# Patient Record
Sex: MALE | Race: WHITE | NOT HISPANIC OR LATINO
[De-identification: names, ages, dates, MRNs, and addresses within clinical notes are randomized per-mention and may not be internally consistent; named-entity substitution may affect disease eponyms.]

---

## 2017-06-05 ENCOUNTER — APPOINTMENT (OUTPATIENT)
Dept: NEUROLOGY | Facility: CLINIC | Age: 62
End: 2017-06-05

## 2017-08-14 ENCOUNTER — INPATIENT (INPATIENT)
Facility: HOSPITAL | Age: 62
LOS: 4 days | Discharge: HOME | DRG: 32 | End: 2017-08-19
Attending: NEUROLOGICAL SURGERY | Admitting: NEUROLOGICAL SURGERY
Payer: COMMERCIAL

## 2017-08-14 VITALS
HEART RATE: 50 BPM | SYSTOLIC BLOOD PRESSURE: 177 MMHG | RESPIRATION RATE: 16 BRPM | DIASTOLIC BLOOD PRESSURE: 80 MMHG | OXYGEN SATURATION: 100 % | TEMPERATURE: 98 F

## 2017-08-14 DIAGNOSIS — Z98.2 PRESENCE OF CEREBROSPINAL FLUID DRAINAGE DEVICE: Chronic | ICD-10-CM

## 2017-08-14 PROCEDURE — 99285 EMERGENCY DEPT VISIT HI MDM: CPT | Mod: 25

## 2017-08-14 PROCEDURE — 93010 ELECTROCARDIOGRAM REPORT: CPT

## 2017-08-15 DIAGNOSIS — G91.9 HYDROCEPHALUS, UNSPECIFIED: ICD-10-CM

## 2017-08-15 LAB
ALBUMIN SERPL ELPH-MCNC: 4.3 G/DL — SIGNIFICANT CHANGE UP (ref 3.3–5)
ALP SERPL-CCNC: 74 U/L — SIGNIFICANT CHANGE UP (ref 40–120)
ALT FLD-CCNC: 17 U/L RC — SIGNIFICANT CHANGE UP (ref 10–45)
ANION GAP SERPL CALC-SCNC: 14 MMOL/L — SIGNIFICANT CHANGE UP (ref 5–17)
APPEARANCE CSF: CLEAR — SIGNIFICANT CHANGE UP
APTT BLD: 34.9 SEC — SIGNIFICANT CHANGE UP (ref 27.5–37.4)
AST SERPL-CCNC: 16 U/L — SIGNIFICANT CHANGE UP (ref 10–40)
BASOPHILS # BLD AUTO: 0.1 K/UL — SIGNIFICANT CHANGE UP (ref 0–0.2)
BILIRUB SERPL-MCNC: 1.1 MG/DL — SIGNIFICANT CHANGE UP (ref 0.2–1.2)
BUN SERPL-MCNC: 21 MG/DL — SIGNIFICANT CHANGE UP (ref 7–23)
CALCIUM SERPL-MCNC: 9.2 MG/DL — SIGNIFICANT CHANGE UP (ref 8.4–10.5)
CHLORIDE SERPL-SCNC: 107 MMOL/L — SIGNIFICANT CHANGE UP (ref 96–108)
CK MB CFR SERPL CALC: 2.2 NG/ML — SIGNIFICANT CHANGE UP (ref 0–6.7)
CK SERPL-CCNC: 64 U/L — SIGNIFICANT CHANGE UP (ref 30–200)
CO2 SERPL-SCNC: 21 MMOL/L — LOW (ref 22–31)
COLOR CSF: SIGNIFICANT CHANGE UP
CREAT SERPL-MCNC: 1.11 MG/DL — SIGNIFICANT CHANGE UP (ref 0.5–1.3)
EOSINOPHIL # BLD AUTO: 0.2 K/UL — SIGNIFICANT CHANGE UP (ref 0–0.5)
EOSINOPHIL NFR BLD AUTO: 1 % — SIGNIFICANT CHANGE UP (ref 0–6)
GLUCOSE CSF-MCNC: 68 MG/DL — SIGNIFICANT CHANGE UP (ref 40–70)
GLUCOSE SERPL-MCNC: 121 MG/DL — HIGH (ref 70–99)
GRAM STN FLD: SIGNIFICANT CHANGE UP
HCT VFR BLD CALC: 48.8 % — SIGNIFICANT CHANGE UP (ref 39–50)
HGB BLD-MCNC: 17.9 G/DL — HIGH (ref 13–17)
INR BLD: 1.11 RATIO — SIGNIFICANT CHANGE UP (ref 0.88–1.16)
LACTATE CSF-MCNC: 1.8 MMOL/L — SIGNIFICANT CHANGE UP (ref 1.1–2.4)
LYMPHOCYTES # BLD AUTO: 1 K/UL — SIGNIFICANT CHANGE UP (ref 1–3.3)
LYMPHOCYTES # BLD AUTO: 7 % — LOW (ref 13–44)
MCHC RBC-ENTMCNC: 33.8 PG — SIGNIFICANT CHANGE UP (ref 27–34)
MCHC RBC-ENTMCNC: 36.8 GM/DL — HIGH (ref 32–36)
MCV RBC AUTO: 92 FL — SIGNIFICANT CHANGE UP (ref 80–100)
MONOCYTES # BLD AUTO: 0.9 K/UL — SIGNIFICANT CHANGE UP (ref 0–0.9)
MONOCYTES NFR BLD AUTO: 7 % — SIGNIFICANT CHANGE UP (ref 2–14)
NEUTROPHILS # BLD AUTO: 8.4 K/UL — HIGH (ref 1.8–7.4)
NEUTROPHILS # CSF: SIGNIFICANT CHANGE UP (ref 0–6)
NEUTROPHILS NFR BLD AUTO: 83 % — HIGH (ref 43–77)
NRBC NFR CSF: <1 — SIGNIFICANT CHANGE UP (ref 0–5)
PLATELET # BLD AUTO: 151 K/UL — SIGNIFICANT CHANGE UP (ref 150–400)
POTASSIUM SERPL-MCNC: 3.7 MMOL/L — SIGNIFICANT CHANGE UP (ref 3.5–5.3)
POTASSIUM SERPL-SCNC: 3.7 MMOL/L — SIGNIFICANT CHANGE UP (ref 3.5–5.3)
PROT CSF-MCNC: 16 MG/DL — SIGNIFICANT CHANGE UP (ref 15–45)
PROT SERPL-MCNC: 6.9 G/DL — SIGNIFICANT CHANGE UP (ref 6–8.3)
PROTHROM AB SERPL-ACNC: 12.1 SEC — SIGNIFICANT CHANGE UP (ref 9.8–12.7)
RBC # BLD: 5.3 M/UL — SIGNIFICANT CHANGE UP (ref 4.2–5.8)
RBC # CSF: 0 /UL — SIGNIFICANT CHANGE UP (ref 0–0)
RBC # FLD: 12.5 % — SIGNIFICANT CHANGE UP (ref 10.3–14.5)
SODIUM SERPL-SCNC: 142 MMOL/L — SIGNIFICANT CHANGE UP (ref 135–145)
SPECIMEN SOURCE: SIGNIFICANT CHANGE UP
TROPONIN T SERPL-MCNC: <0.01 NG/ML — SIGNIFICANT CHANGE UP (ref 0–0.06)
TUBE TYPE: SIGNIFICANT CHANGE UP
WBC # BLD: 10.6 K/UL — HIGH (ref 3.8–10.5)
WBC # FLD AUTO: 10.6 K/UL — HIGH (ref 3.8–10.5)

## 2017-08-15 PROCEDURE — 74000: CPT | Mod: 26

## 2017-08-15 PROCEDURE — 70250 X-RAY EXAM OF SKULL: CPT | Mod: 26

## 2017-08-15 PROCEDURE — 70450 CT HEAD/BRAIN W/O DYE: CPT | Mod: 26

## 2017-08-15 PROCEDURE — 71010: CPT | Mod: 26

## 2017-08-15 RX ORDER — ACETAMINOPHEN 500 MG
1000 TABLET ORAL ONCE
Qty: 0 | Refills: 0 | Status: DISCONTINUED | OUTPATIENT
Start: 2017-08-15 | End: 2017-08-16

## 2017-08-15 RX ORDER — ACETAZOLAMIDE 250 MG/1
250 TABLET ORAL
Qty: 0 | Refills: 0 | Status: DISCONTINUED | OUTPATIENT
Start: 2017-08-15 | End: 2017-08-15

## 2017-08-15 RX ORDER — DOCUSATE SODIUM 100 MG
100 CAPSULE ORAL THREE TIMES A DAY
Qty: 0 | Refills: 0 | Status: DISCONTINUED | OUTPATIENT
Start: 2017-08-15 | End: 2017-08-19

## 2017-08-15 RX ORDER — ONDANSETRON 8 MG/1
4 TABLET, FILM COATED ORAL EVERY 6 HOURS
Qty: 0 | Refills: 0 | Status: DISCONTINUED | OUTPATIENT
Start: 2017-08-15 | End: 2017-08-19

## 2017-08-15 RX ORDER — SENNA PLUS 8.6 MG/1
2 TABLET ORAL AT BEDTIME
Qty: 0 | Refills: 0 | Status: DISCONTINUED | OUTPATIENT
Start: 2017-08-15 | End: 2017-08-19

## 2017-08-15 RX ORDER — POTASSIUM CHLORIDE 20 MEQ
20 PACKET (EA) ORAL
Qty: 0 | Refills: 0 | Status: DISCONTINUED | OUTPATIENT
Start: 2017-08-15 | End: 2017-08-18

## 2017-08-15 RX ORDER — ENOXAPARIN SODIUM 100 MG/ML
40 INJECTION SUBCUTANEOUS EVERY 24 HOURS
Qty: 0 | Refills: 0 | Status: DISCONTINUED | OUTPATIENT
Start: 2017-08-15 | End: 2017-08-16

## 2017-08-15 RX ORDER — DEXTROSE MONOHYDRATE, SODIUM CHLORIDE, AND POTASSIUM CHLORIDE 50; .745; 4.5 G/1000ML; G/1000ML; G/1000ML
1000 INJECTION, SOLUTION INTRAVENOUS
Qty: 0 | Refills: 0 | Status: DISCONTINUED | OUTPATIENT
Start: 2017-08-15 | End: 2017-08-15

## 2017-08-15 RX ORDER — SODIUM CHLORIDE 9 MG/ML
1000 INJECTION INTRAMUSCULAR; INTRAVENOUS; SUBCUTANEOUS ONCE
Qty: 0 | Refills: 0 | Status: COMPLETED | OUTPATIENT
Start: 2017-08-15 | End: 2017-08-15

## 2017-08-15 RX ADMIN — Medication 20 MILLIEQUIVALENT(S): at 17:42

## 2017-08-15 RX ADMIN — SODIUM CHLORIDE 1000 MILLILITER(S): 9 INJECTION INTRAMUSCULAR; INTRAVENOUS; SUBCUTANEOUS at 01:10

## 2017-08-15 RX ADMIN — Medication 100 MILLIGRAM(S): at 14:12

## 2017-08-15 NOTE — ED PROVIDER NOTE - MEDICAL DECISION MAKING DETAILS
Jean-Claude Elizalde MD (resident): 62 M w/ Hx of hydrocephalus s/p  shunt who p/w confusion and headache, w/ synopal episode during standing for the XR, w/o any associated CP or SOB or palpitations; neuro intact. NSG consult. CT and shunt series.

## 2017-08-15 NOTE — H&P ADULT - HISTORY OF PRESENT ILLNESS
62M with history of hydrocephalus 2/2 traumatic brain injury s/p VPS placed in 2001, last revised in 2010 with Dr. Tinajero p/w worsening gait, memory problems, and fatigue over past 2 weeks. He follows with Dr. Valentine and had his shunt re-adjusted 1 month ago. On CT head today hydrocephalus noted. On eval, he denies headache. Wife corroborates symptoms. No urinary incontinence.

## 2017-08-15 NOTE — ED PROVIDER NOTE - OBJECTIVE STATEMENT
Jean-Claude Elizalde MD (resident): 62 M w/ Hx of hydrocephalus s/p  shunt w/ multiple shunt revisions, who comes in for increased confusion.    Neuro: Bruno Valentine  NSG: Surinder Jean-Claude Elizalde MD (resident): 62 M w/ Hx of hydrocephalus s/p  shunt w/ multiple shunt revisions, who comes in for increased confusion, bitemporal headache, feeling off balance. As per wife, pt with decreased short term memory and abnormal behavior at home by pulling out all the dresser drawers. No weakness, numbness.     Neuro: Bruno Valentine  NSG: Surinder

## 2017-08-15 NOTE — H&P ADULT - ASSESSMENT
Mtailda 62M with history of hydrocephalus s/p VPS placed in 2001, last revised in 2010 with Dr. Tinajero p/w worsening gait, memory problems, and fatigue over past 2 weeks with significantly increased vents on CT.    -admit to neurosurgery  -shunt strata II adjusted to 1.5 last night  -shunt tapped and CSF sent; good flow, pressure < 20

## 2017-08-15 NOTE — ED PROVIDER NOTE - NS ED ROS FT
No fever, no chills, no change in vision, no change in hearing, no chest pain, no shortness of breath, no abdominal pain, no vomiting, no dysuria, no muscle pain, no rashes, + headache. ~ Jean-Claude Elizalde MD

## 2017-08-15 NOTE — ED ADULT NURSE REASSESSMENT NOTE - NS ED NURSE REASSESS COMMENT FT1
Report given to Holding MIKE Wallace. Patient A&O x 4, neuro intact. aware of bed assignment, vital signs stable. Patient in stable condition.

## 2017-08-15 NOTE — ED ADULT NURSE REASSESSMENT NOTE - NS ED NURSE REASSESS COMMENT FT1
received report from Sacha MCKEON. pt resting comfortably in stretcher. A&Ox4. VSS. NAD noted. pt denies pain. family at bedside. pt admitted, awaiting bed. gross neuro intact, PMS x4. dysphagia screening completed. plan of care discussed. safety and comfort measures maintained.

## 2017-08-15 NOTE — ED PROVIDER NOTE - PHYSICAL EXAMINATION
Physical Exam: elderly M who is in NAD, AAOx3, NCAT, MMM, neck is supple, PERRL, CTAB, mild bradycardia and regular rhythm, abdomen is soft and NTND, No edema, No deformity of extremities, No rashes, CN grossly intact, No focal motor or sensory deficits. ~ Jean-Claude Elizalde MD Physical Exam: elderly M who is in NAD, AAOx2, NCAT, MMM, neck is supple, PERRL, CTAB, mild bradycardia and regular rhythm, abdomen is soft and NTND, No edema, No deformity of extremities, No rashes, CN III-XII intact, No focal motor or sensory deficits. 5/5 strength~ Jean-Claude Elizalde MD

## 2017-08-15 NOTE — ED PROVIDER NOTE - PROGRESS NOTE DETAILS
Jean-Claude Elizalde MD (resident): patient had a syncopal event while standing for XR, and vomited once. Seen by NSG and adjusted  shunt for increased hydrocephalus on CT Jean-Claude Elizalde MD (resident): no significant improvement, will be admitted to NSG

## 2017-08-15 NOTE — ED PROVIDER NOTE - ATTENDING CONTRIBUTION TO CARE
attending Bertin: 62yM h/o hydrocephalus s/p  shunt w/ multiple shunt revisions, presents with increased confusion, bitemporal headache, feeling off balance worsening x several weeks. Recently started on diamox for these symptoms. On exam, well-appearing, oriented x 2, unsteady gait, motor/sensory intact, no pronator drift, normal finger to nose. Will obtain CT head, shunt series, bloodwork including electrolytes given new diuretics, ekg, NSG consultation and reassess.

## 2017-08-15 NOTE — H&P ADULT - NSHPPHYSICALEXAM_GEN_ALL_CORE
AOx3, FC, PERRL, EOMI, V1-3 intact, no facial, palate neeru symmetric, tongue midline, shrug 5/5  5/5 throughout, no drift  SILT  No clonus or babinski  Abnormal, shuffling gait    -Valves depress and refill readily; under low pressure (tapped)

## 2017-08-16 DIAGNOSIS — G91.9 HYDROCEPHALUS, UNSPECIFIED: ICD-10-CM

## 2017-08-16 DIAGNOSIS — R20.0 ANESTHESIA OF SKIN: ICD-10-CM

## 2017-08-16 DIAGNOSIS — Z01.818 ENCOUNTER FOR OTHER PREPROCEDURAL EXAMINATION: ICD-10-CM

## 2017-08-16 LAB
ANION GAP SERPL CALC-SCNC: 14 MMOL/L — SIGNIFICANT CHANGE UP (ref 5–17)
BLD GP AB SCN SERPL QL: NEGATIVE — SIGNIFICANT CHANGE UP
BUN SERPL-MCNC: 18 MG/DL — SIGNIFICANT CHANGE UP (ref 7–23)
CALCIUM SERPL-MCNC: 8.6 MG/DL — SIGNIFICANT CHANGE UP (ref 8.4–10.5)
CHLORIDE SERPL-SCNC: 106 MMOL/L — SIGNIFICANT CHANGE UP (ref 96–108)
CO2 SERPL-SCNC: 18 MMOL/L — LOW (ref 22–31)
CREAT SERPL-MCNC: 1.01 MG/DL — SIGNIFICANT CHANGE UP (ref 0.5–1.3)
GLUCOSE SERPL-MCNC: 110 MG/DL — HIGH (ref 70–99)
HCT VFR BLD CALC: 45.2 % — SIGNIFICANT CHANGE UP (ref 39–50)
HGB BLD-MCNC: 16.5 G/DL — SIGNIFICANT CHANGE UP (ref 13–17)
MCHC RBC-ENTMCNC: 31.3 PG — SIGNIFICANT CHANGE UP (ref 27–34)
MCHC RBC-ENTMCNC: 36.5 GM/DL — HIGH (ref 32–36)
MCV RBC AUTO: 85.8 FL — SIGNIFICANT CHANGE UP (ref 80–100)
PLATELET # BLD AUTO: 153 K/UL — SIGNIFICANT CHANGE UP (ref 150–400)
POTASSIUM SERPL-MCNC: 3.9 MMOL/L — SIGNIFICANT CHANGE UP (ref 3.5–5.3)
POTASSIUM SERPL-SCNC: 3.9 MMOL/L — SIGNIFICANT CHANGE UP (ref 3.5–5.3)
RBC # BLD: 5.27 M/UL — SIGNIFICANT CHANGE UP (ref 4.2–5.8)
RBC # FLD: 13.6 % — SIGNIFICANT CHANGE UP (ref 10.3–14.5)
RH IG SCN BLD-IMP: POSITIVE — SIGNIFICANT CHANGE UP
SODIUM SERPL-SCNC: 138 MMOL/L — SIGNIFICANT CHANGE UP (ref 135–145)
WBC # BLD: 7.33 K/UL — SIGNIFICANT CHANGE UP (ref 3.8–10.5)
WBC # FLD AUTO: 7.33 K/UL — SIGNIFICANT CHANGE UP (ref 3.8–10.5)

## 2017-08-16 PROCEDURE — 70450 CT HEAD/BRAIN W/O DYE: CPT | Mod: 26

## 2017-08-16 PROCEDURE — 99223 1ST HOSP IP/OBS HIGH 75: CPT

## 2017-08-16 RX ORDER — SODIUM,POTASSIUM PHOSPHATES 278-250MG
1 POWDER IN PACKET (EA) ORAL
Qty: 0 | Refills: 0 | Status: COMPLETED | OUTPATIENT
Start: 2017-08-16 | End: 2017-08-17

## 2017-08-16 RX ORDER — DEXTROSE MONOHYDRATE, SODIUM CHLORIDE, AND POTASSIUM CHLORIDE 50; .745; 4.5 G/1000ML; G/1000ML; G/1000ML
1000 INJECTION, SOLUTION INTRAVENOUS
Qty: 0 | Refills: 0 | Status: DISCONTINUED | OUTPATIENT
Start: 2017-08-16 | End: 2017-08-18

## 2017-08-16 RX ADMIN — Medication 1 TABLET(S): at 23:38

## 2017-08-16 RX ADMIN — Medication 20 MILLIEQUIVALENT(S): at 17:06

## 2017-08-16 RX ADMIN — Medication 20 MILLIEQUIVALENT(S): at 05:17

## 2017-08-16 RX ADMIN — Medication 1 TABLET(S): at 23:30

## 2017-08-16 NOTE — PHYSICAL THERAPY INITIAL EVALUATION ADULT - PERTINENT HX OF CURRENT PROBLEM, REHAB EVAL
Pt is 62M admitted 8/15/17 PMHx hydrocephalus 2/2 traumatic brain injury, s/p VPS(2001), last revised in 2010; p/w worsening gait, memory problems, & fatigue over past 2 wks. On CT head today hydrocephalus noted.

## 2017-08-16 NOTE — PHYSICAL THERAPY INITIAL EVALUATION ADULT - PRECAUTIONS/LIMITATIONS, REHAB EVAL
CTH: Slight increase in size of the temporal horns when compared with the prior study. no known precautions/limitations/CTH: Slight increase in size of the temporal horns when compared with the prior study.

## 2017-08-16 NOTE — PHYSICAL THERAPY INITIAL EVALUATION ADULT - GENERAL OBSERVATIONS, REHAB EVAL
Pt received OOB sitting in chair, A&Ox3 (requiring choices for year), spouse at bedside, following all commands

## 2017-08-16 NOTE — PROGRESS NOTE ADULT - ASSESSMENT
62M with history of hydrocephalus 2/2 traumatic brain injury s/p VPS placed in 2001, last revised in 2010 with Dr. Tinajero p/w worsening gait, memory problems, and fatigue over past 2 weeks. He follows with Dr. Valentine and had his shunt re-adjusted 1 month ago. On CT head today hydrocephalus noted. s/p shunt tapped and reset to strata@1.5 on 8/15/17    Plan:  neuro- Rpt CT head early am. Tentatively on schedule for shunt revision if hydrocephalus persists. NPO after midnight  Vitals stable  Dr Tinajero discussed plans at length with patient and  and family

## 2017-08-16 NOTE — PHYSICAL THERAPY INITIAL EVALUATION ADULT - DISCHARGE DISPOSITION, PT EVAL
no skilled PT needs/home/home with no skilled PT needs, assist from spouse as necessary; pt functionally independent at this time, pt cleared for DC home by PT standpoint

## 2017-08-16 NOTE — CONSULT NOTE ADULT - ASSESSMENT
62M with VPS (strata 2 @ 2.0) recently adjusted in office 1 month ago p/w worsening hydrocephalus noted on CT head today. Skull x-ray confirmed setting at 2.0... Valve re-adjusted to 1.5 and will re-eval patient in AM for symptom improvement. If symptoms are improved, he may follow up as outpatient with Dr. Tinajero.
62M c hx TBI c/b hydrocephalus s/p  shunt (2001) with multiple revisions, lumbar stenosis, cspine herniated discs, b/l LE heel numbness, pw intermittent memory problems and worsening gait instability likely related to new moderate hydrocephalus.

## 2017-08-16 NOTE — PHYSICAL THERAPY INITIAL EVALUATION ADULT - ADDITIONAL COMMENTS
Pt resides in private home with spouse, about 4 steps to enter, flight to bedroom. PTA independent in mobility and ADL's, owns no DME, (+)driving, on disability (retired chiropractor).

## 2017-08-16 NOTE — CONSULT NOTE ADULT - PROBLEM SELECTOR RECOMMENDATION 9
- pt has very good >4 mets, no hx of heart disease or risk factors for heart disease  - RCRI score of 0, or low risk of major cardiac event  - Coreas criteria score of 7, or low moderate risk of perioperative cardiac event  - would replete potassium to >4 for pt's frequent pvcs on ekg, as well as pt's phos.  - pt had a cardiac evaluation performed 3 years ago that was reportedly normal  - pt is medically optimized for surgery, no contraindication from internal medicine standpoint to proceed with procedure.
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
## 2017-08-17 LAB
ANION GAP SERPL CALC-SCNC: 11 MMOL/L — SIGNIFICANT CHANGE UP (ref 5–17)
BUN SERPL-MCNC: 19 MG/DL — SIGNIFICANT CHANGE UP (ref 7–23)
CALCIUM SERPL-MCNC: 8.6 MG/DL — SIGNIFICANT CHANGE UP (ref 8.4–10.5)
CHLORIDE SERPL-SCNC: 108 MMOL/L — SIGNIFICANT CHANGE UP (ref 96–108)
CO2 SERPL-SCNC: 23 MMOL/L — SIGNIFICANT CHANGE UP (ref 22–31)
CREAT SERPL-MCNC: 0.84 MG/DL — SIGNIFICANT CHANGE UP (ref 0.5–1.3)
GLUCOSE SERPL-MCNC: 111 MG/DL — HIGH (ref 70–99)
HCT VFR BLD CALC: 45.6 % — SIGNIFICANT CHANGE UP (ref 39–50)
HGB BLD-MCNC: 16.8 G/DL — SIGNIFICANT CHANGE UP (ref 13–17)
MCHC RBC-ENTMCNC: 33.5 PG — SIGNIFICANT CHANGE UP (ref 27–34)
MCHC RBC-ENTMCNC: 36.9 GM/DL — HIGH (ref 32–36)
MCV RBC AUTO: 90.8 FL — SIGNIFICANT CHANGE UP (ref 80–100)
PLATELET # BLD AUTO: 135 K/UL — LOW (ref 150–400)
POTASSIUM SERPL-MCNC: 3.8 MMOL/L — SIGNIFICANT CHANGE UP (ref 3.5–5.3)
POTASSIUM SERPL-SCNC: 3.8 MMOL/L — SIGNIFICANT CHANGE UP (ref 3.5–5.3)
RBC # BLD: 5.02 M/UL — SIGNIFICANT CHANGE UP (ref 4.2–5.8)
RBC # FLD: 12.4 % — SIGNIFICANT CHANGE UP (ref 10.3–14.5)
SODIUM SERPL-SCNC: 142 MMOL/L — SIGNIFICANT CHANGE UP (ref 135–145)
WBC # BLD: 7.1 K/UL — SIGNIFICANT CHANGE UP (ref 3.8–10.5)
WBC # FLD AUTO: 7.1 K/UL — SIGNIFICANT CHANGE UP (ref 3.8–10.5)

## 2017-08-17 PROCEDURE — 62225 REPLACE/IRRIGATE CATHETER: CPT

## 2017-08-17 PROCEDURE — 70450 CT HEAD/BRAIN W/O DYE: CPT | Mod: 26

## 2017-08-17 PROCEDURE — 70450 CT HEAD/BRAIN W/O DYE: CPT | Mod: 26,77

## 2017-08-17 RX ORDER — OXYCODONE AND ACETAMINOPHEN 5; 325 MG/1; MG/1
1 TABLET ORAL EVERY 4 HOURS
Qty: 0 | Refills: 0 | Status: DISCONTINUED | OUTPATIENT
Start: 2017-08-17 | End: 2017-08-19

## 2017-08-17 RX ORDER — CEFAZOLIN SODIUM 1 G
2000 VIAL (EA) INJECTION EVERY 8 HOURS
Qty: 0 | Refills: 0 | Status: COMPLETED | OUTPATIENT
Start: 2017-08-17 | End: 2017-08-17

## 2017-08-17 RX ORDER — ACETAMINOPHEN 500 MG
1000 TABLET ORAL ONCE
Qty: 0 | Refills: 0 | Status: DISCONTINUED | OUTPATIENT
Start: 2017-08-17 | End: 2017-08-19

## 2017-08-17 RX ORDER — OXYCODONE AND ACETAMINOPHEN 5; 325 MG/1; MG/1
2 TABLET ORAL EVERY 4 HOURS
Qty: 0 | Refills: 0 | Status: DISCONTINUED | OUTPATIENT
Start: 2017-08-17 | End: 2017-08-19

## 2017-08-17 RX ORDER — HYDROMORPHONE HYDROCHLORIDE 2 MG/ML
0.5 INJECTION INTRAMUSCULAR; INTRAVENOUS; SUBCUTANEOUS
Qty: 0 | Refills: 0 | Status: DISCONTINUED | OUTPATIENT
Start: 2017-08-17 | End: 2017-08-17

## 2017-08-17 RX ADMIN — DEXTROSE MONOHYDRATE, SODIUM CHLORIDE, AND POTASSIUM CHLORIDE 75 MILLILITER(S): 50; .745; 4.5 INJECTION, SOLUTION INTRAVENOUS at 11:22

## 2017-08-17 RX ADMIN — Medication 1 TABLET(S): at 05:28

## 2017-08-17 RX ADMIN — Medication 100 MILLIGRAM(S): at 15:39

## 2017-08-17 RX ADMIN — Medication 20 MILLIEQUIVALENT(S): at 05:28

## 2017-08-17 RX ADMIN — Medication 20 MILLIEQUIVALENT(S): at 18:45

## 2017-08-17 RX ADMIN — OXYCODONE AND ACETAMINOPHEN 1 TABLET(S): 5; 325 TABLET ORAL at 22:04

## 2017-08-17 RX ADMIN — OXYCODONE AND ACETAMINOPHEN 1 TABLET(S): 5; 325 TABLET ORAL at 21:34

## 2017-08-17 RX ADMIN — Medication 100 MILLIGRAM(S): at 15:38

## 2017-08-17 RX ADMIN — OXYCODONE AND ACETAMINOPHEN 2 TABLET(S): 5; 325 TABLET ORAL at 13:36

## 2017-08-17 RX ADMIN — OXYCODONE AND ACETAMINOPHEN 2 TABLET(S): 5; 325 TABLET ORAL at 14:57

## 2017-08-17 RX ADMIN — Medication 100 MILLIGRAM(S): at 21:08

## 2017-08-17 NOTE — BRIEF OPERATIVE NOTE - PROCEDURE
Ventriculoperitoneal shunt revision  08/17/2017  Opened up wali hole at proximal catheter and reconnected; no replacement of system  Active  [id number]

## 2017-08-18 ENCOUNTER — TRANSCRIPTION ENCOUNTER (OUTPATIENT)
Age: 62
End: 2017-08-18

## 2017-08-18 DIAGNOSIS — D72.828 OTHER ELEVATED WHITE BLOOD CELL COUNT: ICD-10-CM

## 2017-08-18 LAB
ALBUMIN SERPL ELPH-MCNC: 3.6 G/DL — SIGNIFICANT CHANGE UP (ref 3.3–5)
ALP SERPL-CCNC: 64 U/L — SIGNIFICANT CHANGE UP (ref 40–120)
ALT FLD-CCNC: 17 U/L — SIGNIFICANT CHANGE UP (ref 10–45)
ANION GAP SERPL CALC-SCNC: 15 MMOL/L — SIGNIFICANT CHANGE UP (ref 5–17)
AST SERPL-CCNC: 19 U/L — SIGNIFICANT CHANGE UP (ref 10–40)
BILIRUB SERPL-MCNC: 1 MG/DL — SIGNIFICANT CHANGE UP (ref 0.2–1.2)
BUN SERPL-MCNC: 19 MG/DL — SIGNIFICANT CHANGE UP (ref 7–23)
CALCIUM SERPL-MCNC: 8.7 MG/DL — SIGNIFICANT CHANGE UP (ref 8.4–10.5)
CHLORIDE SERPL-SCNC: 104 MMOL/L — SIGNIFICANT CHANGE UP (ref 96–108)
CO2 SERPL-SCNC: 22 MMOL/L — SIGNIFICANT CHANGE UP (ref 22–31)
CREAT SERPL-MCNC: 0.89 MG/DL — SIGNIFICANT CHANGE UP (ref 0.5–1.3)
GLUCOSE SERPL-MCNC: 121 MG/DL — HIGH (ref 70–99)
HCT VFR BLD CALC: 43.4 % — SIGNIFICANT CHANGE UP (ref 39–50)
HGB BLD-MCNC: 15.4 G/DL — SIGNIFICANT CHANGE UP (ref 13–17)
MAGNESIUM SERPL-MCNC: 2.2 MG/DL — SIGNIFICANT CHANGE UP (ref 1.6–2.6)
MCHC RBC-ENTMCNC: 30.9 PG — SIGNIFICANT CHANGE UP (ref 27–34)
MCHC RBC-ENTMCNC: 35.5 GM/DL — SIGNIFICANT CHANGE UP (ref 32–36)
MCV RBC AUTO: 87.1 FL — SIGNIFICANT CHANGE UP (ref 80–100)
PHOSPHATE SERPL-MCNC: 2.7 MG/DL — SIGNIFICANT CHANGE UP (ref 2.5–4.5)
PLATELET # BLD AUTO: 155 K/UL — SIGNIFICANT CHANGE UP (ref 150–400)
POTASSIUM SERPL-MCNC: 3.8 MMOL/L — SIGNIFICANT CHANGE UP (ref 3.5–5.3)
POTASSIUM SERPL-SCNC: 3.8 MMOL/L — SIGNIFICANT CHANGE UP (ref 3.5–5.3)
PROT SERPL-MCNC: 6.4 G/DL — SIGNIFICANT CHANGE UP (ref 6–8.3)
RBC # BLD: 4.98 M/UL — SIGNIFICANT CHANGE UP (ref 4.2–5.8)
RBC # FLD: 13.2 % — SIGNIFICANT CHANGE UP (ref 10.3–14.5)
SODIUM SERPL-SCNC: 141 MMOL/L — SIGNIFICANT CHANGE UP (ref 135–145)
WBC # BLD: 11.47 K/UL — HIGH (ref 3.8–10.5)
WBC # FLD AUTO: 11.47 K/UL — HIGH (ref 3.8–10.5)

## 2017-08-18 PROCEDURE — 99232 SBSQ HOSP IP/OBS MODERATE 35: CPT

## 2017-08-18 RX ORDER — ENOXAPARIN SODIUM 100 MG/ML
40 INJECTION SUBCUTANEOUS EVERY 24 HOURS
Qty: 0 | Refills: 0 | Status: DISCONTINUED | OUTPATIENT
Start: 2017-08-18 | End: 2017-08-19

## 2017-08-18 RX ORDER — OXYCODONE HYDROCHLORIDE 5 MG/1
2 TABLET ORAL
Qty: 0 | Refills: 0 | COMMUNITY
Start: 2017-08-18

## 2017-08-18 RX ORDER — HYDRALAZINE HCL 50 MG
10 TABLET ORAL EVERY 4 HOURS
Qty: 0 | Refills: 0 | Status: DISCONTINUED | OUTPATIENT
Start: 2017-08-18 | End: 2017-08-19

## 2017-08-18 RX ORDER — ACETAZOLAMIDE 250 MG/1
0 TABLET ORAL
Qty: 0 | Refills: 0 | COMMUNITY

## 2017-08-18 RX ORDER — DOCUSATE SODIUM 100 MG
1 CAPSULE ORAL
Qty: 0 | Refills: 0 | COMMUNITY
Start: 2017-08-18

## 2017-08-18 RX ORDER — POTASSIUM CHLORIDE 20 MEQ
1 PACKET (EA) ORAL
Qty: 0 | Refills: 0 | COMMUNITY

## 2017-08-18 RX ORDER — SENNA PLUS 8.6 MG/1
2 TABLET ORAL
Qty: 0 | Refills: 0 | COMMUNITY
Start: 2017-08-18

## 2017-08-18 RX ORDER — HYDRALAZINE HCL 50 MG
10 TABLET ORAL ONCE
Qty: 0 | Refills: 0 | Status: COMPLETED | OUTPATIENT
Start: 2017-08-18 | End: 2017-08-18

## 2017-08-18 RX ORDER — OXYCODONE HYDROCHLORIDE 5 MG/1
1 TABLET ORAL
Qty: 42 | Refills: 0 | OUTPATIENT
Start: 2017-08-18 | End: 2017-08-25

## 2017-08-18 RX ADMIN — Medication 100 MILLIGRAM(S): at 21:00

## 2017-08-18 RX ADMIN — Medication 20 MILLIEQUIVALENT(S): at 05:39

## 2017-08-18 RX ADMIN — Medication 10 MILLIGRAM(S): at 20:33

## 2017-08-18 RX ADMIN — Medication 10 MILLIGRAM(S): at 17:23

## 2017-08-18 NOTE — DISCHARGE NOTE ADULT - MEDICATION SUMMARY - MEDICATIONS TO TAKE
I will START or STAY ON the medications listed below when I get home from the hospital:    acetaminophen-oxycodone 325 mg-5 mg oral tablet  -- 1 tab(s) by mouth every 4 hours, As needed, Moderate Pain (4 - 6) MDD:5  -- Indication: For Moderate pain    acetaminophen-oxycodone 325 mg-5 mg oral tablet  -- 2 tab(s) by mouth every 4 hours, As needed, Severe Pain (7 - 10)  -- Indication: For Severe pain    Diamox  --  by mouth   -- Indication: For Hydrocephalus    docusate sodium 100 mg oral capsule  -- 1 cap(s) by mouth 3 times a day  -- Indication: For stool softener    senna oral tablet  -- 2 tab(s) by mouth once a day (at bedtime), As needed, Constipation  -- Indication: For Constipation    Klor-Con M20 oral tablet, extended release  -- 1 tab(s) by mouth 2 times a day  -- Indication: For Potassium replacement I will START or STAY ON the medications listed below when I get home from the hospital:    acetaminophen-oxycodone 325 mg-5 mg oral tablet  -- 1 tab(s) by mouth every 4 hours, As needed, Moderate Pain (4 - 6) MDD:5  -- Indication: For Moderate pain    acetaminophen-oxycodone 325 mg-5 mg oral tablet  -- 2 tab(s) by mouth every 4 hours, As needed, Severe Pain (7 - 10)  -- Indication: For Severe pain    docusate sodium 100 mg oral capsule  -- 1 cap(s) by mouth 3 times a day  -- Indication: For stool softener    senna oral tablet  -- 2 tab(s) by mouth once a day (at bedtime), As needed, Constipation  -- Indication: For Constipation I will START or STAY ON the medications listed below when I get home from the hospital:    acetaminophen-oxycodone 325 mg-5 mg oral tablet  -- 1 tab(s) by mouth every 4 hours, As needed, Moderate Pain (4 - 6) MDD:5  -- Indication: For Hydrocephalus    acetaminophen-oxycodone 325 mg-5 mg oral tablet  -- 2 tab(s) by mouth every 4 hours, As needed, Severe Pain (7 - 10) MDD:12 tablets  -- Indication: For Hydrocephalus    docusate sodium 100 mg oral capsule  -- 1 cap(s) by mouth 3 times a day  -- Indication: For stool softener    senna oral tablet  -- 2 tab(s) by mouth once a day (at bedtime), As needed, Constipation  -- Indication: For Constipation

## 2017-08-18 NOTE — DISCHARGE NOTE ADULT - CARE PLAN
Principal Discharge DX:	Hydrocephalus  Goal:	Strengthening  Instructions for follow-up, activity and diet:	Call Neurosurgery Dr RADHA Tinajero [phone] for appointment on Tuesday in the [place] [facility] for staple removal and wound check. removal.  Followup with your Private MD in 1-2 weeks.  Return to Emergency Department or contact your Neurosurgeon if any changes in mental status, weakness, numbness or tingling of extremities; difficulty swallowing; drainage or redness of wound, fever; pain in legs; difficulty urinating or constipation.  Donot restart your Aspirin or take any Motrin/NSAIDS until checking with your Neurosurgeon.  Instructions for follow-up, activity and diet:	No strenous activity. No heavy lifting. Do not return to work until cleared by physician. No driving until cleared by physician.  You may shower on the 3rd day after you surgery.  No soaking in tub,  Donot apply any ointements to incision.  Your Shunt is a Strata set at 1.0 on 8/18AM Principal Discharge DX:	Hydrocephalus  Goal:	Strengthening  Instructions for follow-up, activity and diet:	Call Neurosurgery Dr RADHA Tinajero [phone] for appointment on Tuesday in the [place] [facility] for staple removal and wound check. removal.  Followup with your Private MD in 1-2 weeks.  Return to Emergency Department or contact your Neurosurgeon if any changes in mental status, weakness, numbness or tingling of extremities; difficulty swallowing; drainage or redness of wound, fever; pain in legs; difficulty urinating or constipation.  Donot restart your Aspirin or take any Motrin/NSAIDS until checking with your Neurosurgeon.  Instructions for follow-up, activity and diet:	No strenous activity. No heavy lifting. Do not return to work until cleared by physician. No driving until cleared by physician.  You may shower on the 3rd day after you surgery.  No soaking in tub,  Donot apply any ointements to incision.  Your Shunt is a Strata set at 1.0 on 8/18AM Principal Discharge DX:	Hydrocephalus  Goal:	Strengthening  Instructions for follow-up, activity and diet:	Call Neurosurgery Dr RADHA Tinajero [phone] for appointment on Tuesday in the [place] [facility] for staple removal and wound check. removal.  Followup with your Private MD in 1-2 weeks.  Return to Emergency Department or contact your Neurosurgeon if any changes in mental status, weakness, numbness or tingling of extremities; difficulty swallowing; drainage or redness of wound, fever; pain in legs; difficulty urinating or constipation.  Donot restart your Aspirin or take any Motrin/NSAIDS until checking with your Neurosurgeon.  Instructions for follow-up, activity and diet:	No strenous activity. No heavy lifting. Do not return to work until cleared by physician. No driving until cleared by physician.  You may shower on the 3rd day after you surgery.  No soaking in tub,  Donot apply any ointements to incision.  Your Shunt is a Strata set at 1.0 on 8/18AM Principal Discharge DX:	Hydrocephalus  Goal:	Strengthening  Instructions for follow-up, activity and diet:	Call Neurosurgery Dr RADHA Tinajero [phone] for appointment on Tuesday in the [place] [facility] for staple removal and wound check. removal.  Followup with your Private MD in 1-2 weeks.  Return to Emergency Department or contact your Neurosurgeon if any changes in mental status, weakness, numbness or tingling of extremities; difficulty swallowing; drainage or redness of wound, fever; pain in legs; difficulty urinating or constipation.  Donot restart your Aspirin or take any Motrin/NSAIDS until checking with your Neurosurgeon.  Instructions for follow-up, activity and diet:	No strenous activity. No heavy lifting. Do not return to work until cleared by physician. No driving until cleared by physician.  You may shower on the 3rd day after you surgery.  No soaking in tub,  Donot apply any ointements to incision.  Your Shunt is a Strata set at 1.0 on 8/18AM Principal Discharge DX:	Hydrocephalus  Goal:	Strengthening  Instructions for follow-up, activity and diet:	Call Neurosurgery Dr RADHA Tinajero [phone] for appointment on Tuesday in the [place] [facility] for staple removal and wound check. removal.  Followup with your Private MD in 1-2 weeks.  Return to Emergency Department or contact your Neurosurgeon if any changes in mental status, weakness, numbness or tingling of extremities; difficulty swallowing; drainage or redness of wound, fever; pain in legs; difficulty urinating or constipation.  Donot restart your Aspirin or take any Motrin/NSAIDS until checking with your Neurosurgeon.  Instructions for follow-up, activity and diet:	No strenous activity. No heavy lifting. Do not return to work until cleared by physician. No driving until cleared by physician.  You may shower on the 3rd day after you surgery.  No soaking in tub,  Donot apply any ointements to incision.  Your Shunt is a Strata set at 1.0 on 8/18AM Principal Discharge DX:	Hydrocephalus  Goal:	Strengthening  Instructions for follow-up, activity and diet:	Call Neurosurgery Dr RADHA Tinajero [phone] for appointment this week in the [place] [facility] for staple removal and wound check. removal.  Followup with your Private MD in 1-2 weeks.  Return to Emergency Department or contact your Neurosurgeon if any changes in mental status, weakness, numbness or tingling of extremities; difficulty swallowing; drainage or redness of wound, fever; pain in legs; difficulty urinating or constipation.  Do not restart your Aspirin or take any Motrin/NSAIDS until checking with your Neurosurgeon.  Instructions for follow-up, activity and diet:	No strenous activity. No heavy lifting. Do not return to work until cleared by physician. No driving until cleared by physician.  You may shower on the 3rd day after you surgery.  No soaking in tub,  Donot apply any ointements to incision.  Your Shunt is a Strata set at 1.0 on 8/18AM Principal Discharge DX:	Hydrocephalus  Goal:	Strengthening  Instructions for follow-up, activity and diet:	Call Neurosurgery Dr RADHA Tinajero [phone] for appointment this week in the [place] [facility] for staple removal and wound check. removal.  Followup with your Private MD in 1-2 weeks.  Return to Emergency Department or contact your Neurosurgeon if any changes in mental status, weakness, numbness or tingling of extremities; difficulty swallowing; drainage or redness of wound, fever; pain in legs; difficulty urinating or constipation.  Do not restart your Aspirin or take any Motrin/NSAIDS until checking with your Neurosurgeon.  Instructions for follow-up, activity and diet:	No strenous activity. No heavy lifting. Do not return to work until cleared by physician. No driving until cleared by physician.  You may shower on the 3rd day after you surgery.  No soaking in tub,  Donot apply any ointements to incision.  Your Shunt is a Strata set at 1.0 on 8/18AM Principal Discharge DX:	Hydrocephalus  Goal:	Strengthening  Instructions for follow-up, activity and diet:	Call Neurosurgery Dr RADHA Tinajero [phone] for appointment this week in the [place] [facility] for staple removal and wound check. removal.  Followup with your Private MD in 1-2 weeks.  Return to Emergency Department or contact your Neurosurgeon if any changes in mental status, weakness, numbness or tingling of extremities; difficulty swallowing; drainage or redness of wound, fever; pain in legs; difficulty urinating or constipation.  Do not restart your Aspirin or take any Motrin/NSAIDS until checking with your Neurosurgeon.  Instructions for follow-up, activity and diet:	No strenous activity. No heavy lifting. Do not return to work until cleared by physician. No driving until cleared by physician.  You may shower on the 3rd day after you surgery.  No soaking in tub,  Donot apply any ointements to incision.  Your Shunt is a Strata set at 1.0 on 8/18AM

## 2017-08-18 NOTE — PROGRESS NOTE ADULT - SUBJECTIVE AND OBJECTIVE BOX
SUBJECTIVE:   Ambulating hallways. No major complaints. Short term memory loss  OVERNIGHT EVENTS: none    Vital Signs Last 24 Hrs  T(C): 36.9 (16 Aug 2017 16:05), Max: 37 (15 Aug 2017 23:38)  HR: 74 (16 Aug 2017 16:05) (41 - 74)  BP: 132/82 (16 Aug 2017 16:05) (132/82 - 150/74)  BP(mean): --  RR: 18 (16 Aug 2017 16:05) (17 - 18)  SpO2: 97% (16 Aug 2017 16:05) (96% - 98%)    PHYSICAL EXAM:    Constitutional: No Acute Distress     Neurological: AOx3, Following Commands, Moving all Extremities 5/5. Sensation intact.       Pulmonary: Clear to Auscultation, No rales, No rhonchi, No wheezes     Cardiovascular: S1, S2, Regular rate and rhythm     Gastrointestinal: Soft, Non-tender, Non-distended     Extremities: No calf tenderness     LABS:                        16.5   7.33  )-----------( 153      ( 16 Aug 2017 08:10 )             45.2    08-16    138  |  106  |  18  ----------------------------<  110<H>  3.9   |  18<L>  |  1.01    Ca    8.6      16 Aug 2017 08:50            IMAGING:     CT head- Slight increase in size of the temporal horns when compared with CT head yesterday          MEDICATIONS:  docusate sodium 100 milliGRAM(s) Oral three times a day  senna 2 Tablet(s) Oral at bedtime PRN Constipation  potassium chloride    Tablet ER 20 milliEquivalent(s) Oral two times a day  enoxaparin Injectable 40 milliGRAM(s) SubCutaneous every 24 hours      DIET:     regular
SUBJECTIVE: No complaints. In chair this AM. Doing well    OVERNIGHT EVENTS: None    Vital Signs Last 24 Hrs  T(C): 36.8 (18 Aug 2017 08:20), Max: 37.2 (18 Aug 2017 04:51)  T(F): 98.2 (18 Aug 2017 08:20), Max: 99 (18 Aug 2017 04:51)  HR: 79 (18 Aug 2017 10:02) (65 - 112)  BP: 158/77 (18 Aug 2017 10:02) (135/78 - 163/82)  BP(mean): --  RR: 18 (18 Aug 2017 08:20) (17 - 18)  SpO2: 98% (18 Aug 2017 08:20) (95% - 98%)  IVF: [ X] NSK 75  DIET: [X ] Regular [ ] CCD [ ] Renal [ ] Puree [ ] Dysphagia [ ] Tube Feeds:   PCA: [ ] YES [X ] NO   BLANCO: [ ] YES [ ] NO [ X] VOID   BM: [ X] YES on 8/15    DRAINS: None    PHYSICAL EXAM:    General: No Acute Distress     Neurological: Awake, alert oriented to person, place and time, Following Commands, PERRL, EOMI, Face Symmetrical, Speech Fluent, Moving all extremities, Muscle Strength normal in all four extremities, No Drift, Sensation to Light Touch Intact    Pulmonary: Clear to Auscultation, No Rales, No Rhonchi, No Wheezes     Cardiovascular: S1, S2, Regular Rate and Rhythm     Gastrointestinal: Soft, Nontender, Nondistended     Extremities: No calf tenderness     Incision: Scalp +staples. CDI, Flat    LABS:                        15.4   11.47 )-----------( 155      ( 18 Aug 2017 07:12 )             43.4    08-18    141  |  104  |  19  ----------------------------<  121<H>  3.8   |  22  |  0.89    Ca    8.7      18 Aug 2017 07:26  Phos  2.7     08-18  Mg     2.2     08-18    TPro  6.4  /  Alb  3.6  /  TBili  1.0  /  DBili  x   /  AST  19  /  ALT  17  /  AlkPhos  64  08-18        08-17 @ 07:01  -  08-18 @ 07:00  --------------------------------------------------------  IN: 1330 mL / OUT: 800 mL / NET: 530 mL      IMAGING:< from: CT Head No Cont (08.17.17 @ 20:45) >  Stable ventricular system size and configuration with postoperative small   amounts of intracranial air.    MEDICATIONS  (STANDING):  potassium chloride    Tablet ER 20 milliEquivalent(s) Oral two times a day  docusate sodium 100 milliGRAM(s) Oral three times a day  sodium chloride 0.9% with potassium chloride 20 mEq/L 1000 milliLiter(s) (75 mL/Hr) IV Continuous <Continuous>    MEDICATIONS  (PRN):  ondansetron   Disintegrating Tablet 4 milliGRAM(s) Oral every 6 hours PRN Nausea  senna 2 Tablet(s) Oral at bedtime PRN Constipation  acetaminophen  IVPB. 1000 milliGRAM(s) IV Intermittent once PRN Mild Pain (1 - 3)  oxyCODONE    5 mG/acetaminophen 325 mG 1 Tablet(s) Oral every 4 hours PRN Moderate Pain (4 - 6)  oxyCODONE    5 mG/acetaminophen 325 mG 2 Tablet(s) Oral every 4 hours PRN Severe Pain (7 - 10)
JIMENEZ KRAFT  62y  Male      Patient is a 62y old  Male who presents with a chief complaint of sp VPS, worsening gait, fatigue, memory problems  62M with history of hydrocephalus 2/2 traumatic brain injury s/p VPS placed in 2001, last revised in 2010 with Dr. Tinajero p/w worsening gait, memory problems, and fatigue over past 2 weeks. He follows with Dr. Valentine and had his shunt re-adjusted 1 month ago. On CT head today hydrocephalus noted. On eval, he denies headache. Wife corroborates symptoms. No urinary incontinence. (18 Aug 2017 13:03)      INTERVAL HPI/OVERNIGHT EVENTS: s/p  revision yesterday.  Patient states less "pressure" in head.  Able to ambulte without imbalance this AM.  Denies all other complaints.         T(C): 36.8 (08-18-17 @ 08:20), Max: 37.2 (08-18-17 @ 04:51)  HR: 79 (08-18-17 @ 10:02) (65 - 81)  BP: 158/77 (08-18-17 @ 10:02) (136/71 - 163/82)  RR: 18 (08-18-17 @ 08:20) (17 - 18)  SpO2: 98% (08-18-17 @ 08:20) (95% - 98%)  Wt(kg): --Vital Signs Last 24 Hrs  T(C): 36.8 (18 Aug 2017 08:20), Max: 37.2 (18 Aug 2017 04:51)  T(F): 98.2 (18 Aug 2017 08:20), Max: 99 (18 Aug 2017 04:51)  HR: 79 (18 Aug 2017 10:02) (65 - 81)  BP: 158/77 (18 Aug 2017 10:02) (136/71 - 163/82)  BP(mean): --  RR: 18 (18 Aug 2017 08:20) (17 - 18)  SpO2: 98% (18 Aug 2017 08:20) (95% - 98%)  Wt(kg): --    PHYSICAL EXAM:  GENERAL: NAD, well-groomed, well-developed  HEAD:  bandage over head c/d/i  ENMT:Moist mucous membranes  NECK: Supple,  CHEST/LUNG: Clear to percussion bilaterally; No rales, rhonchi, wheezing, or rubs  HEART: Regular rate and rhythm; No murmurs, rubs, or gallops  ABDOMEN: Soft, Nontender, Nondistended; Bowel sounds present.    EXTREMITIES:  2+ Peripheral Pulses, No clubbing, cyanosis, or edema  NERVOUS SYSTEM:  ; Motor Strength 5/5 B/L upper and lower extremities.      Consultant(s) Notes Reviewed:  [x ] YES  [ ] NO  Care Discussed with Consultants/Other Providers [ x] YES  [ ] NO: neurosurgery PA (Saurabh    LABS:                        15.4   11.47 )-----------( 155      ( 18 Aug 2017 07:12 )             43.4     08-18    141  |  104  |  19  ----------------------------<  121<H>  3.8   |  22  |  0.89    Ca    8.7      18 Aug 2017 07:26  Phos  2.7     08-18  Mg     2.2     08-18    TPro  6.4  /  Alb  3.6  /  TBili  1.0  /  DBili  x   /  AST  19  /  ALT  17  /  AlkPhos  64  08-18        CAPILLARY BLOOD GLUCOSE                RADIOLOGY & ADDITIONAL TESTS:    < from: CT Head No Cont (08.17.17 @ 20:45) >    IMPRESSION:  Stable ventricular system size and configuration with postoperative small   amounts of intracranial air.    < end of copied text >    Imaging Personally Reviewed:  [ ] YES  [ ] NO

## 2017-08-18 NOTE — DISCHARGE NOTE ADULT - HOSPITAL COURSE
The patient was admitted on 8/15/2017 and taken to the OR this same day.  Postop course was uneventful.  The patient was on Ancef and SQL for routine postop management.  His shunt was dial down from 1.5 to 1.0 on 8/18AM.  He is to FU with Dr Tinajero on Tues at the [facility].  He takes Tylenol for pain only and donot want narcotics.    The patient was evaluated by PT and no needs was recommended .   He was subsequently DC on 8/18/2017 in stable condition. The patient was admitted on 8/15/2017 and taken to the OR this same day.  Postop course was uneventful.  The patient was on Ancef and SQL for routine postop management.  His shunt was dial down from 1.5 to 1.0 on 8/18AM.  He is to FU with Dr Tinajero  at the [place] [facility].  He takes Tylenol for pain only and donot want narcotics.    The patient was evaluated by PT and no needs was recommended . CTH 8/19 showed postoperative changes. He was subsequently DC on 8/19/2017 in stable condition.

## 2017-08-18 NOTE — PROGRESS NOTE ADULT - ASSESSMENT
62M with history of hydrocephalus 2/2 traumatic brain injury s/p VPS placed in 2001, last revised in 2010 with Dr. Tinajero p/w worsening gait, memory problems, and fatigue over past 2 weeks. He follows with Dr. Valentine and had his shunt re-adjusted 1 month ago. On CT head today hydrocephalus noted. On eval, he denies headache. Wife corroborates symptoms. No urinary incontinence. (15 Aug 2017 09:34)    PROCEDURE: Adm 8/15 Revision Proximal Ventriculoperitoneal shunt Strata 2 -set at 1.5 and changed to 1.0 on 8/18.     POD#1    PLAN:  Neuro: Shunt decreased to 1.0 this AM.  Monitor Leukocytosis clinically. 8/15 CSF NGTD. DC Home today. FU Dr RADHA Tinajero on Tues. Just want Tyl for pain.    Respiratory: Patient instructed to use incentive spirometer [x ] YES [ ] NO  :               DVT ppx: [ ] SQL [ ] SQH and Venodynes [ ] Left [ ] Right [ X] Bilateral    Discharge planning: PT-No needs 62M with history of hydrocephalus 2/2 traumatic brain injury s/p VPS placed in 2001, last revised in 2010 with Dr. Tinajero p/w worsening gait, memory problems, and fatigue over past 2 weeks. He follows with Dr. Valentine and had his shunt re-adjusted 1 month ago. On CT head today hydrocephalus noted. On eval, he denies headache. Wife corroborates symptoms. No urinary incontinence. (15 Aug 2017 09:34)    PROCEDURE: Adm 8/15 Revision Proximal Ventriculoperitoneal shunt Strata 2 -set at 1.5 and changed to 1.0 on 8/18.     POD#1    PLAN:  Neuro: Shunt decreased to 1.0 this AM.  Monitor Leukocytosis clinically. 8/15 CSF NGTD. DC Home today. FU Dr RADHA Tinajero on Tues. Just want Tyl for pain.  Addendum @ 3:20pm:  Shunt was decreased to 1.0 this AM. Pt was to be DC Home today. However on arrival of family at pt bedside at this time-noted pt to be slightly confused.    Exam: Amb independently. No HA, AAO x2, but not to Month. +FC, MEEHAN 5/5.  PERRLA. This was d/w Dr Tinajero at this time and discharged was canceled today.  FU CT Brain AM and if stable will discharge. This was d/w the patient family and agreeable.    Respiratory: Patient instructed to use incentive spirometer [x ] YES [ ] NO  :               DVT ppx: [ ] SQL [ ] SQH and Venodynes [ ] Left [ ] Right [ X] Bilateral    Discharge planning: PT-No needs

## 2017-08-18 NOTE — DISCHARGE NOTE ADULT - MEDICATION SUMMARY - MEDICATIONS TO STOP TAKING
I will STOP taking the medications listed below when I get home from the hospital:  None I will STOP taking the medications listed below when I get home from the hospital:    Diamox  --  by mouth    Klor-Con M20 oral tablet, extended release  -- 1 tab(s) by mouth 2 times a day

## 2017-08-18 NOTE — DISCHARGE NOTE ADULT - PLAN OF CARE
Strengthening Call Neurosurgery Dr RADHA Tinajero [phone] for appointment on Tuesday in the [place] [facility] for staple removal and wound check. removal.  Followup with your Private MD in 1-2 weeks.  Return to Emergency Department or contact your Neurosurgeon if any changes in mental status, weakness, numbness or tingling of extremities; difficulty swallowing; drainage or redness of wound, fever; pain in legs; difficulty urinating or constipation.  Donot restart your Aspirin or take any Motrin/NSAIDS until checking with your Neurosurgeon. No strenous activity. No heavy lifting. Do not return to work until cleared by physician. No driving until cleared by physician.  You may shower on the 3rd day after you surgery.  No soaking in tub,  Donot apply any ointements to incision.  Your Shunt is a Strata set at 1.0 on 8/18AM Call Neurosurgery Dr RADHA Tinajero [phone] for appointment this week in the [place] Office for staple removal and wound check. removal.  Followup with your Private MD in 1-2 weeks.  Return to Emergency Department or contact your Neurosurgeon if any changes in mental status, weakness, numbness or tingling of extremities; difficulty swallowing; drainage or redness of wound, fever; pain in legs; difficulty urinating or constipation.  Do not restart your Aspirin or take any Motrin/NSAIDS until checking with your Neurosurgeon.

## 2017-08-18 NOTE — PROGRESS NOTE ADULT - PROBLEM SELECTOR PLAN 1
s/p shunt revision.   -stable hydrocephalus form CT   -discussed with PA, to revise shunt setting down.  -follow up CT and neuro exams per NSX s/p shunt revision.   -stable hydrocephalus form CT   -discussed with PA, to revise shunt setting down.  -follow up CT and neuro exams per NSX  -would D/C standing 20meQ hyperkalemia BID, change to 20 meQ daily while on diamox

## 2017-08-18 NOTE — DISCHARGE NOTE ADULT - NS AS ACTIVITY OBS
No Heavy lifting/straining/Showering allowed/Do not make important decisions/Bathing allowed/Walking-Outdoors allowed/Walking-Indoors allowed/Stairs allowed/Do not drive or operate machinery

## 2017-08-18 NOTE — PROGRESS NOTE ADULT - ASSESSMENT
62M c hx TBI c/b hydrocephalus s/p  shunt (2001) with multiple revisions, lumbar stenosis, cspine herniated discs, b/l LE heel numbness, pw intermittent memory problems and worsening gait instability likely related to new moderate hydrocephalus.

## 2017-08-18 NOTE — DISCHARGE NOTE ADULT - CARE PROVIDER_API CALL
Luis Tinajero (DO), Neurological Surgery  [address] [place] [address]  Phone: [phone]  Fax: [phone]

## 2017-08-18 NOTE — DISCHARGE NOTE ADULT - REASON FOR ADMISSION
sp VPS, worsening gait, fatigue, memory problems  62M with history of hydrocephalus 2/2 traumatic brain injury s/p VPS placed in 2001, last revised in 2010 with Dr. Tinajero p/w worsening gait, memory problems, and fatigue over past 2 weeks. He follows with Dr. Valentine and had his shunt re-adjusted 1 month ago. On CT head today hydrocephalus noted. On eval, he denies headache. Wife corroborates symptoms. No urinary incontinence.

## 2017-08-19 VITALS
TEMPERATURE: 98 F | HEART RATE: 73 BPM | RESPIRATION RATE: 18 BRPM | DIASTOLIC BLOOD PRESSURE: 75 MMHG | OXYGEN SATURATION: 97 % | SYSTOLIC BLOOD PRESSURE: 131 MMHG

## 2017-08-19 LAB
HCT VFR BLD CALC: 43.1 % — SIGNIFICANT CHANGE UP (ref 39–50)
HGB BLD-MCNC: 15.5 G/DL — SIGNIFICANT CHANGE UP (ref 13–17)
MCHC RBC-ENTMCNC: 33.1 PG — SIGNIFICANT CHANGE UP (ref 27–34)
MCHC RBC-ENTMCNC: 36 GM/DL — SIGNIFICANT CHANGE UP (ref 32–36)
MCV RBC AUTO: 92 FL — SIGNIFICANT CHANGE UP (ref 80–100)
PLATELET # BLD AUTO: 149 K/UL — LOW (ref 150–400)
RBC # BLD: 4.68 M/UL — SIGNIFICANT CHANGE UP (ref 4.2–5.8)
RBC # FLD: 12.1 % — SIGNIFICANT CHANGE UP (ref 10.3–14.5)
WBC # BLD: 9 K/UL — SIGNIFICANT CHANGE UP (ref 3.8–10.5)
WBC # FLD AUTO: 9 K/UL — SIGNIFICANT CHANGE UP (ref 3.8–10.5)

## 2017-08-19 PROCEDURE — 85610 PROTHROMBIN TIME: CPT

## 2017-08-19 PROCEDURE — 85027 COMPLETE CBC AUTOMATED: CPT

## 2017-08-19 PROCEDURE — 71010: CPT

## 2017-08-19 PROCEDURE — 83605 ASSAY OF LACTIC ACID: CPT

## 2017-08-19 PROCEDURE — 84100 ASSAY OF PHOSPHORUS: CPT

## 2017-08-19 PROCEDURE — 82945 GLUCOSE OTHER FLUID: CPT

## 2017-08-19 PROCEDURE — 87070 CULTURE OTHR SPECIMN AEROBIC: CPT

## 2017-08-19 PROCEDURE — 89051 BODY FLUID CELL COUNT: CPT

## 2017-08-19 PROCEDURE — 85730 THROMBOPLASTIN TIME PARTIAL: CPT

## 2017-08-19 PROCEDURE — 86850 RBC ANTIBODY SCREEN: CPT

## 2017-08-19 PROCEDURE — 80053 COMPREHEN METABOLIC PANEL: CPT

## 2017-08-19 PROCEDURE — 99285 EMERGENCY DEPT VISIT HI MDM: CPT | Mod: 25

## 2017-08-19 PROCEDURE — 86901 BLOOD TYPING SEROLOGIC RH(D): CPT

## 2017-08-19 PROCEDURE — 87205 SMEAR GRAM STAIN: CPT

## 2017-08-19 PROCEDURE — 70450 CT HEAD/BRAIN W/O DYE: CPT

## 2017-08-19 PROCEDURE — 82550 ASSAY OF CK (CPK): CPT

## 2017-08-19 PROCEDURE — C1889: CPT

## 2017-08-19 PROCEDURE — 82553 CREATINE MB FRACTION: CPT

## 2017-08-19 PROCEDURE — 70450 CT HEAD/BRAIN W/O DYE: CPT | Mod: 26

## 2017-08-19 PROCEDURE — 74000: CPT

## 2017-08-19 PROCEDURE — 97161 PT EVAL LOW COMPLEX 20 MIN: CPT

## 2017-08-19 PROCEDURE — 97164 PT RE-EVAL EST PLAN CARE: CPT

## 2017-08-19 PROCEDURE — 84484 ASSAY OF TROPONIN QUANT: CPT

## 2017-08-19 PROCEDURE — 93005 ELECTROCARDIOGRAM TRACING: CPT

## 2017-08-19 PROCEDURE — 70250 X-RAY EXAM OF SKULL: CPT

## 2017-08-19 PROCEDURE — 80048 BASIC METABOLIC PNL TOTAL CA: CPT

## 2017-08-19 PROCEDURE — 86900 BLOOD TYPING SEROLOGIC ABO: CPT

## 2017-08-19 PROCEDURE — 83735 ASSAY OF MAGNESIUM: CPT

## 2017-08-19 PROCEDURE — 84157 ASSAY OF PROTEIN OTHER: CPT

## 2017-08-19 RX ORDER — OXYCODONE HYDROCHLORIDE 5 MG/1
2 TABLET ORAL
Qty: 60 | Refills: 0 | OUTPATIENT
Start: 2017-08-19 | End: 2017-08-24

## 2017-08-19 RX ORDER — OXYCODONE HYDROCHLORIDE 5 MG/1
1 TABLET ORAL
Qty: 42 | Refills: 0 | OUTPATIENT
Start: 2017-08-19 | End: 2017-08-26

## 2017-08-19 RX ADMIN — Medication 10 MILLIGRAM(S): at 08:20

## 2017-08-19 RX ADMIN — Medication 100 MILLIGRAM(S): at 05:04

## 2017-08-20 ENCOUNTER — INPATIENT (INPATIENT)
Facility: HOSPITAL | Age: 62
LOS: 2 days | Discharge: HOME | DRG: 32 | End: 2017-08-23
Attending: NEUROLOGICAL SURGERY | Admitting: NEUROLOGICAL SURGERY
Payer: COMMERCIAL

## 2017-08-20 VITALS
HEART RATE: 80 BPM | TEMPERATURE: 98 F | RESPIRATION RATE: 18 BRPM | OXYGEN SATURATION: 98 % | DIASTOLIC BLOOD PRESSURE: 87 MMHG | SYSTOLIC BLOOD PRESSURE: 154 MMHG

## 2017-08-20 DIAGNOSIS — Z98.2 PRESENCE OF CEREBROSPINAL FLUID DRAINAGE DEVICE: Chronic | ICD-10-CM

## 2017-08-20 DIAGNOSIS — Z98.2 PRESENCE OF CEREBROSPINAL FLUID DRAINAGE DEVICE: ICD-10-CM

## 2017-08-20 LAB
ALBUMIN SERPL ELPH-MCNC: 4.2 G/DL — SIGNIFICANT CHANGE UP (ref 3.3–5)
ALP SERPL-CCNC: 70 U/L — SIGNIFICANT CHANGE UP (ref 40–120)
ALT FLD-CCNC: 24 U/L RC — SIGNIFICANT CHANGE UP (ref 10–45)
ANION GAP SERPL CALC-SCNC: 13 MMOL/L — SIGNIFICANT CHANGE UP (ref 5–17)
APTT BLD: 36 SEC — SIGNIFICANT CHANGE UP (ref 27.5–37.4)
AST SERPL-CCNC: 18 U/L — SIGNIFICANT CHANGE UP (ref 10–40)
BASOPHILS # BLD AUTO: 0.1 K/UL — SIGNIFICANT CHANGE UP (ref 0–0.2)
BASOPHILS NFR BLD AUTO: 1 % — SIGNIFICANT CHANGE UP (ref 0–2)
BILIRUB SERPL-MCNC: 0.9 MG/DL — SIGNIFICANT CHANGE UP (ref 0.2–1.2)
BLD GP AB SCN SERPL QL: NEGATIVE — SIGNIFICANT CHANGE UP
BUN SERPL-MCNC: 19 MG/DL — SIGNIFICANT CHANGE UP (ref 7–23)
CALCIUM SERPL-MCNC: 9 MG/DL — SIGNIFICANT CHANGE UP (ref 8.4–10.5)
CHLORIDE SERPL-SCNC: 100 MMOL/L — SIGNIFICANT CHANGE UP (ref 96–108)
CO2 SERPL-SCNC: 25 MMOL/L — SIGNIFICANT CHANGE UP (ref 22–31)
CREAT SERPL-MCNC: 0.83 MG/DL — SIGNIFICANT CHANGE UP (ref 0.5–1.3)
EOSINOPHIL # BLD AUTO: 0.3 K/UL — SIGNIFICANT CHANGE UP (ref 0–0.5)
EOSINOPHIL NFR BLD AUTO: 3.1 % — SIGNIFICANT CHANGE UP (ref 0–6)
GLUCOSE SERPL-MCNC: 97 MG/DL — SIGNIFICANT CHANGE UP (ref 70–99)
HCT VFR BLD CALC: 45.1 % — SIGNIFICANT CHANGE UP (ref 39–50)
HGB BLD-MCNC: 16.3 G/DL — SIGNIFICANT CHANGE UP (ref 13–17)
INR BLD: 1.11 RATIO — SIGNIFICANT CHANGE UP (ref 0.88–1.16)
LYMPHOCYTES # BLD AUTO: 2.7 K/UL — SIGNIFICANT CHANGE UP (ref 1–3.3)
LYMPHOCYTES # BLD AUTO: 29.6 % — SIGNIFICANT CHANGE UP (ref 13–44)
MCHC RBC-ENTMCNC: 33.1 PG — SIGNIFICANT CHANGE UP (ref 27–34)
MCHC RBC-ENTMCNC: 36.2 GM/DL — HIGH (ref 32–36)
MCV RBC AUTO: 91.7 FL — SIGNIFICANT CHANGE UP (ref 80–100)
MONOCYTES # BLD AUTO: 1 K/UL — HIGH (ref 0–0.9)
MONOCYTES NFR BLD AUTO: 11 % — SIGNIFICANT CHANGE UP (ref 2–14)
NEUTROPHILS # BLD AUTO: 5 K/UL — SIGNIFICANT CHANGE UP (ref 1.8–7.4)
NEUTROPHILS NFR BLD AUTO: 55.3 % — SIGNIFICANT CHANGE UP (ref 43–77)
PLATELET # BLD AUTO: 183 K/UL — SIGNIFICANT CHANGE UP (ref 150–400)
POTASSIUM SERPL-MCNC: 4 MMOL/L — SIGNIFICANT CHANGE UP (ref 3.5–5.3)
POTASSIUM SERPL-SCNC: 4 MMOL/L — SIGNIFICANT CHANGE UP (ref 3.5–5.3)
PROT SERPL-MCNC: 6.8 G/DL — SIGNIFICANT CHANGE UP (ref 6–8.3)
PROTHROM AB SERPL-ACNC: 12.1 SEC — SIGNIFICANT CHANGE UP (ref 9.8–12.7)
RBC # BLD: 4.92 M/UL — SIGNIFICANT CHANGE UP (ref 4.2–5.8)
RBC # FLD: 12.2 % — SIGNIFICANT CHANGE UP (ref 10.3–14.5)
RH IG SCN BLD-IMP: POSITIVE — SIGNIFICANT CHANGE UP
SODIUM SERPL-SCNC: 138 MMOL/L — SIGNIFICANT CHANGE UP (ref 135–145)
WBC # BLD: 9.1 K/UL — SIGNIFICANT CHANGE UP (ref 3.8–10.5)
WBC # FLD AUTO: 9.1 K/UL — SIGNIFICANT CHANGE UP (ref 3.8–10.5)

## 2017-08-20 PROCEDURE — 70450 CT HEAD/BRAIN W/O DYE: CPT | Mod: 26

## 2017-08-20 PROCEDURE — 93010 ELECTROCARDIOGRAM REPORT: CPT

## 2017-08-20 PROCEDURE — 99285 EMERGENCY DEPT VISIT HI MDM: CPT | Mod: 25

## 2017-08-20 RX ORDER — ACETAMINOPHEN 500 MG
650 TABLET ORAL EVERY 6 HOURS
Qty: 0 | Refills: 0 | Status: DISCONTINUED | OUTPATIENT
Start: 2017-08-20 | End: 2017-08-21

## 2017-08-20 RX ORDER — OXYCODONE AND ACETAMINOPHEN 5; 325 MG/1; MG/1
1 TABLET ORAL EVERY 4 HOURS
Qty: 0 | Refills: 0 | Status: DISCONTINUED | OUTPATIENT
Start: 2017-08-20 | End: 2017-08-21

## 2017-08-20 RX ORDER — SENNA PLUS 8.6 MG/1
2 TABLET ORAL AT BEDTIME
Qty: 0 | Refills: 0 | Status: DISCONTINUED | OUTPATIENT
Start: 2017-08-20 | End: 2017-08-21

## 2017-08-20 RX ORDER — ONDANSETRON 8 MG/1
4 TABLET, FILM COATED ORAL EVERY 4 HOURS
Qty: 0 | Refills: 0 | Status: DISCONTINUED | OUTPATIENT
Start: 2017-08-20 | End: 2017-08-21

## 2017-08-20 RX ORDER — DEXTROSE MONOHYDRATE, SODIUM CHLORIDE, AND POTASSIUM CHLORIDE 50; .745; 4.5 G/1000ML; G/1000ML; G/1000ML
1000 INJECTION, SOLUTION INTRAVENOUS
Qty: 0 | Refills: 0 | Status: DISCONTINUED | OUTPATIENT
Start: 2017-08-20 | End: 2017-08-21

## 2017-08-20 RX ORDER — OXYCODONE AND ACETAMINOPHEN 5; 325 MG/1; MG/1
2 TABLET ORAL EVERY 4 HOURS
Qty: 0 | Refills: 0 | Status: DISCONTINUED | OUTPATIENT
Start: 2017-08-20 | End: 2017-08-21

## 2017-08-20 RX ORDER — ACETAMINOPHEN 500 MG
1000 TABLET ORAL ONCE
Qty: 0 | Refills: 0 | Status: COMPLETED | OUTPATIENT
Start: 2017-08-20 | End: 2017-08-20

## 2017-08-20 RX ORDER — DOCUSATE SODIUM 100 MG
100 CAPSULE ORAL THREE TIMES A DAY
Qty: 0 | Refills: 0 | Status: DISCONTINUED | OUTPATIENT
Start: 2017-08-20 | End: 2017-08-21

## 2017-08-20 RX ADMIN — Medication 1000 MILLIGRAM(S): at 22:00

## 2017-08-20 RX ADMIN — Medication 400 MILLIGRAM(S): at 21:10

## 2017-08-20 NOTE — ED PROVIDER NOTE - ATTENDING CONTRIBUTION TO CARE
Patient with chief complaint of headache. Moderate right sided. Persistent. Worsening. Patient with revision on 8/17 but having persistent pain.  ncat, mild distress secondary to pain, non-tachycardic, non-tachycardic, no edema, ncat, perrl, tracking in room, alert, cooperative, pleasant, good  strength, moving all extremities.  Will follow up on labs, analgesia, reassess and disposition to the inpatient team as clinically indicated. See attending statement below

## 2017-08-20 NOTE — ED PROVIDER NOTE - PHYSICAL EXAMINATION
Rebeca Beck, DO:   Gen: Well appearing, NAD  Head: NCAT  HEENT: PERRL, MMM, normal conjunctiva, anicteric, neck supple  Lung: CTAB, no adventitious sounds  CV: RRR, no murmurs, rubs or gallops  Abd: soft, NTND, no rebound or guarding, no CVAT  MSK: No edema, no visible deformities  Neuro: No focal neurologic deficits. CN II-XII intact. 5/5 strength and normal sensation in all extremities. No dysmetria. No diadochokinesia.   Skin: Warm and dry, no evidence of rash  Psych: normal mood and affect

## 2017-08-20 NOTE — ED ADULT NURSE NOTE - OBJECTIVE STATEMENT
61 y/o male, 63 y/o male, history of  shunt originally placed in 2001, walked into ED c/o HA. Patient reports he has had about 5 revisions to shunt in the past with the last revision about 6 years ago. States he has been getting headaches more frequently and was recently hospitalized at [facility] due to "bone growth around shunt".  Patient received surgery and was discharged on Thursday. Patient states he has been feeling like his "equilibrium is off" and his "gait is shuffled" for the past few days. States he called his neurosurgeon who told him to come to ED for repeat CT scan and possible revision of shunt. Patient denies current SOB, CP, abd pain, N/V, fever, dizziness. Currently c/o "unclear vision" and HA 3/10. Lungs clear b/l. Abd soft, nondistended, nontender, PERRL. Patient resting in bed awaiting MD rodriguez. Safety and comfort maintained.

## 2017-08-20 NOTE — ED PROVIDER NOTE - OBJECTIVE STATEMENT
Rebeca Beck, DO: 62M with history of hydrocephalus 2/2 traumatic brain injury s/p VPS placed in 2001, last revised in 2010 with Dr. Tinajero p/w admitted 8/15 with  shunt revision 8/17. Pt discharged 8/19. Pt with urinary incontinence & bowel incontinence yesterday.  Pt with somnolence per wife at bedside & episodes of short-term memory loss. Pt AAOx3. Pt with shuffling gait, unchanged since discharge.

## 2017-08-20 NOTE — H&P ADULT - NSHPPHYSICALEXAM_GEN_ALL_CORE
Awake, alert, AOX1  Unable to recall 3 words at 5 minutes  Unable to spell WORLD backwards  PERRL, EOMI, Face equal, tongue m/l  5/5 throughout   SILT

## 2017-08-20 NOTE — ED PROVIDER NOTE - CARE PLAN
Principal Discharge DX:	Ventriculo-peritoneal shunt status  Secondary Diagnosis:	S/P  shunt  Secondary Diagnosis:	Bladder incontinence Principal Discharge DX:	Ventriculo-peritoneal shunt status  Goal:	Ventriculo-peritoneal shunt malfunction  Secondary Diagnosis:	S/P  shunt  Secondary Diagnosis:	Bladder incontinence

## 2017-08-20 NOTE — ED PROVIDER NOTE - NS ED ROS FT
Rebeca Beck, DO: ROS: denies HA, weakness, dizziness, fevers/chills, nausea/vomiting, chest pain, SOB, diaphoresis, abdominal pain, diarrhea, joint pain, dysuria/hematuria, rash

## 2017-08-20 NOTE — H&P ADULT - HISTORY OF PRESENT ILLNESS
62M PMH TBI s/p VPS, recently s/p VPS revision last week, discharged on Friday, presents to the ED with his family, complaining of worsening short term memory, new urinary incontinence since yesterday, and increasing difficulty walking. He reports very mild headache, otherewise is wide awake, no N/V. CTH done in the ED shows ventriculomegaly.

## 2017-08-21 LAB
APPEARANCE CSF: CLEAR — SIGNIFICANT CHANGE UP
BLD GP AB SCN SERPL QL: NEGATIVE — SIGNIFICANT CHANGE UP
COLOR CSF: ABNORMAL
GLUCOSE CSF-MCNC: 73 MG/DL — HIGH (ref 40–70)
GRAM STN FLD: SIGNIFICANT CHANGE UP
LYMPHOCYTES # CSF: 63 % — SIGNIFICANT CHANGE UP (ref 40–80)
MONOS+MACROS NFR CSF: 7 % — LOW (ref 15–45)
NEUTROPHILS # CSF: 30 % — HIGH (ref 0–6)
NRBC NFR CSF: 2 /UL — SIGNIFICANT CHANGE UP (ref 0–5)
PROT CSF-MCNC: 22 MG/DL — SIGNIFICANT CHANGE UP (ref 15–45)
RBC # CSF: 2220 /UL — HIGH (ref 0–0)
RH IG SCN BLD-IMP: POSITIVE — SIGNIFICANT CHANGE UP
SPECIMEN SOURCE: SIGNIFICANT CHANGE UP
TUBE TYPE: SIGNIFICANT CHANGE UP

## 2017-08-21 PROCEDURE — 70450 CT HEAD/BRAIN W/O DYE: CPT | Mod: 26

## 2017-08-21 PROCEDURE — 62258 REPLACE BRAIN CAVITY SHUNT: CPT | Mod: 78

## 2017-08-21 RX ORDER — ONDANSETRON 8 MG/1
4 TABLET, FILM COATED ORAL ONCE
Qty: 0 | Refills: 0 | Status: DISCONTINUED | OUTPATIENT
Start: 2017-08-21 | End: 2017-08-21

## 2017-08-21 RX ORDER — OXYCODONE AND ACETAMINOPHEN 5; 325 MG/1; MG/1
1 TABLET ORAL EVERY 4 HOURS
Qty: 0 | Refills: 0 | Status: DISCONTINUED | OUTPATIENT
Start: 2017-08-21 | End: 2017-08-23

## 2017-08-21 RX ORDER — OXYCODONE AND ACETAMINOPHEN 5; 325 MG/1; MG/1
2 TABLET ORAL EVERY 4 HOURS
Qty: 0 | Refills: 0 | Status: DISCONTINUED | OUTPATIENT
Start: 2017-08-21 | End: 2017-08-23

## 2017-08-21 RX ORDER — DOCUSATE SODIUM 100 MG
100 CAPSULE ORAL THREE TIMES A DAY
Qty: 0 | Refills: 0 | Status: DISCONTINUED | OUTPATIENT
Start: 2017-08-21 | End: 2017-08-23

## 2017-08-21 RX ORDER — HYDRALAZINE HCL 50 MG
5 TABLET ORAL ONCE
Qty: 0 | Refills: 0 | Status: COMPLETED | OUTPATIENT
Start: 2017-08-21 | End: 2017-08-21

## 2017-08-21 RX ORDER — DEXTROSE MONOHYDRATE, SODIUM CHLORIDE, AND POTASSIUM CHLORIDE 50; .745; 4.5 G/1000ML; G/1000ML; G/1000ML
1000 INJECTION, SOLUTION INTRAVENOUS
Qty: 0 | Refills: 0 | Status: DISCONTINUED | OUTPATIENT
Start: 2017-08-21 | End: 2017-08-22

## 2017-08-21 RX ORDER — ACETAMINOPHEN 500 MG
1000 TABLET ORAL ONCE
Qty: 0 | Refills: 0 | Status: DISCONTINUED | OUTPATIENT
Start: 2017-08-21 | End: 2017-08-23

## 2017-08-21 RX ORDER — CEFAZOLIN SODIUM 1 G
1000 VIAL (EA) INJECTION EVERY 8 HOURS
Qty: 0 | Refills: 0 | Status: COMPLETED | OUTPATIENT
Start: 2017-08-21 | End: 2017-08-22

## 2017-08-21 RX ORDER — HYDROMORPHONE HYDROCHLORIDE 2 MG/ML
0.4 INJECTION INTRAMUSCULAR; INTRAVENOUS; SUBCUTANEOUS
Qty: 0 | Refills: 0 | Status: DISCONTINUED | OUTPATIENT
Start: 2017-08-21 | End: 2017-08-21

## 2017-08-21 RX ADMIN — HYDROMORPHONE HYDROCHLORIDE 0.4 MILLIGRAM(S): 2 INJECTION INTRAMUSCULAR; INTRAVENOUS; SUBCUTANEOUS at 14:30

## 2017-08-21 RX ADMIN — Medication 5 MILLIGRAM(S): at 21:11

## 2017-08-21 RX ADMIN — DEXTROSE MONOHYDRATE, SODIUM CHLORIDE, AND POTASSIUM CHLORIDE 75 MILLILITER(S): 50; .745; 4.5 INJECTION, SOLUTION INTRAVENOUS at 05:53

## 2017-08-21 RX ADMIN — Medication 100 MILLIGRAM(S): at 18:10

## 2017-08-21 RX ADMIN — DEXTROSE MONOHYDRATE, SODIUM CHLORIDE, AND POTASSIUM CHLORIDE 75 MILLILITER(S): 50; .745; 4.5 INJECTION, SOLUTION INTRAVENOUS at 14:00

## 2017-08-21 RX ADMIN — Medication 100 MILLIGRAM(S): at 21:11

## 2017-08-21 RX ADMIN — HYDROMORPHONE HYDROCHLORIDE 0.4 MILLIGRAM(S): 2 INJECTION INTRAMUSCULAR; INTRAVENOUS; SUBCUTANEOUS at 14:20

## 2017-08-21 NOTE — PROGRESS NOTE ADULT - ASSESSMENT
62M POD#0 s/p replacement of VPS system. Patient is at neurologic baseline and stable for transfer to floor.    q4 neuro checks  pain control  PT/OT

## 2017-08-22 PROCEDURE — 86850 RBC ANTIBODY SCREEN: CPT

## 2017-08-22 PROCEDURE — 80053 COMPREHEN METABOLIC PANEL: CPT

## 2017-08-22 PROCEDURE — 86901 BLOOD TYPING SEROLOGIC RH(D): CPT

## 2017-08-22 PROCEDURE — 89051 BODY FLUID CELL COUNT: CPT

## 2017-08-22 PROCEDURE — 87070 CULTURE OTHR SPECIMN AEROBIC: CPT

## 2017-08-22 PROCEDURE — 85027 COMPLETE CBC AUTOMATED: CPT

## 2017-08-22 PROCEDURE — 85610 PROTHROMBIN TIME: CPT

## 2017-08-22 PROCEDURE — 86900 BLOOD TYPING SEROLOGIC ABO: CPT

## 2017-08-22 PROCEDURE — 84157 ASSAY OF PROTEIN OTHER: CPT

## 2017-08-22 PROCEDURE — 85730 THROMBOPLASTIN TIME PARTIAL: CPT

## 2017-08-22 PROCEDURE — 70450 CT HEAD/BRAIN W/O DYE: CPT | Mod: 26

## 2017-08-22 PROCEDURE — 93005 ELECTROCARDIOGRAM TRACING: CPT

## 2017-08-22 PROCEDURE — 70450 CT HEAD/BRAIN W/O DYE: CPT

## 2017-08-22 PROCEDURE — C1889: CPT

## 2017-08-22 PROCEDURE — 99285 EMERGENCY DEPT VISIT HI MDM: CPT | Mod: 25

## 2017-08-22 PROCEDURE — 97161 PT EVAL LOW COMPLEX 20 MIN: CPT

## 2017-08-22 PROCEDURE — 82945 GLUCOSE OTHER FLUID: CPT

## 2017-08-22 PROCEDURE — 87205 SMEAR GRAM STAIN: CPT

## 2017-08-22 PROCEDURE — 96374 THER/PROPH/DIAG INJ IV PUSH: CPT

## 2017-08-22 RX ORDER — LABETALOL HCL 100 MG
10 TABLET ORAL ONCE
Qty: 0 | Refills: 0 | Status: COMPLETED | OUTPATIENT
Start: 2017-08-22 | End: 2017-08-22

## 2017-08-22 RX ORDER — SENNA PLUS 8.6 MG/1
2 TABLET ORAL AT BEDTIME
Qty: 0 | Refills: 0 | Status: DISCONTINUED | OUTPATIENT
Start: 2017-08-22 | End: 2017-08-23

## 2017-08-22 RX ORDER — HYDRALAZINE HCL 50 MG
5 TABLET ORAL ONCE
Qty: 0 | Refills: 0 | Status: COMPLETED | OUTPATIENT
Start: 2017-08-22 | End: 2017-08-22

## 2017-08-22 RX ORDER — ONDANSETRON 8 MG/1
4 TABLET, FILM COATED ORAL EVERY 6 HOURS
Qty: 0 | Refills: 0 | Status: DISCONTINUED | OUTPATIENT
Start: 2017-08-22 | End: 2017-08-23

## 2017-08-22 RX ADMIN — DEXTROSE MONOHYDRATE, SODIUM CHLORIDE, AND POTASSIUM CHLORIDE 75 MILLILITER(S): 50; .745; 4.5 INJECTION, SOLUTION INTRAVENOUS at 05:46

## 2017-08-22 RX ADMIN — Medication 5 MILLIGRAM(S): at 21:53

## 2017-08-22 RX ADMIN — Medication 100 MILLIGRAM(S): at 05:45

## 2017-08-22 RX ADMIN — Medication 100 MILLIGRAM(S): at 12:51

## 2017-08-22 RX ADMIN — Medication 100 MILLIGRAM(S): at 02:00

## 2017-08-22 RX ADMIN — Medication 10 MILLIGRAM(S): at 05:45

## 2017-08-22 NOTE — PROGRESS NOTE ADULT - SUBJECTIVE AND OBJECTIVE BOX
CHIEF COMPLAINT: patient feels clearer, walking much better.     Vital Signs Last 24 Hrs  T(C): 37.4 (22 Aug 2017 15:04), Max: 37.4 (21 Aug 2017 20:23)  T(F): 99.3 (22 Aug 2017 15:04), Max: 99.3 (21 Aug 2017 20:23)  HR: 73 (22 Aug 2017 15:04) (58 - 85)  BP: 138/76 (22 Aug 2017 15:04) (138/76 - 189/95)  BP(mean): 98 (21 Aug 2017 18:00) (98 - 111)  RR: 18 (22 Aug 2017 15:04) (14 - 18)  SpO2: 96% (22 Aug 2017 15:04) (95% - 100%)    PHYSICAL EXAM:    General: No Acute Distress     Neurological: Awake, alert oriented to person, place and time, Following Commands, PERRL, EOMI, Face Symmetrical, Speech Fluent, Moving all extremities, Muscle Strength normal in all four extremities, No Drift, Sensation to Light Touch Intact    Pulmonary: Clear to Auscultation, No Rales, No Rhonchi, No Wheezes     Cardiovascular: S1, S2, Regular Rate and Rhythm     Gastrointestinal: Soft, Nontender, +distended, decreased bowel sounds    Extremities: No calf tenderness     Incision: dressings c/d/i    LABS:                        16.3   9.1   )-----------( 183      ( 20 Aug 2017 21:14 )             45.1    08-20    138  |  100  |  19  ----------------------------<  97  4.0   |  25  |  0.83    Ca    9.0      20 Aug 2017 21:14    TPro  6.8  /  Alb  4.2  /  TBili  0.9  /  DBili  x   /  AST  18  /  ALT  24  /  AlkPhos  70  08-20  PT/INR - ( 20 Aug 2017 21:14 )   PT: 12.1 sec;   INR: 1.11 ratio         PTT - ( 20 Aug 2017 21:14 )  PTT:36.0 sec      08-21 @ 07:01  -  08-22 @ 07:00  --------------------------------------------------------  IN: 940 mL / OUT: 1850 mL / NET: -910 mL    08-22 @ 07:01  - 08-22 @ 15:56  --------------------------------------------------------  IN: 1340 mL / OUT: 0 mL / NET: 1340 mL        MEDICATIONS:    Neuro:  acetaminophen  IVPB. 1000 milliGRAM(s) IV Intermittent once PRN Mild Pain (1 - 3)  oxyCODONE    5 mG/acetaminophen 325 mG 1 Tablet(s) Oral every 4 hours PRN Moderate Pain (4 - 6)  oxyCODONE    5 mG/acetaminophen 325 mG 2 Tablet(s) Oral every 4 hours PRN Severe Pain (7 - 10)  ondansetron Injectable 4 milliGRAM(s) IV Push every 6 hours PRN Nausea and/or Vomiting    GI/:  docusate sodium 100 milliGRAM(s) Oral three times a day    Other:     DIET: [x] Regular [] CCD [] Renal [] Puree [] Dysphagia [] Tube Feeds:     IMAGING:   < from: CT Head No Cont (08.22.17 @ 09:39) >  After revision of the right frontal shunt catheter there is   decreasing ventricular size compared with 8/21/2017. Small amount of   intraventricular hemorrhage is identified. There is no change in the   hemorrhage in the right frontal lobe in the area of the prior   encephalomalacia.    < end of copied text >

## 2017-08-22 NOTE — PROGRESS NOTE ADULT - ASSESSMENT
62M PMH TBI s/p VPS, recently s/p VPS revision last week, discharged on Friday, presents to the ED with his family, complaining of worsening short term memory, new urinary incontinence since yesterday, and increasing difficulty walking. He reports very mild headache, otherewise is wide awake, no N/V. CTH done in the ED shows ventriculomegaly. (20 Aug 2017 22:54)    PAST MEDICAL & SURGICAL HISTORY:  Hydrocephalus  S/P  shunt, multiple revisions     PROCEDURE: 8/22/17 s/p Ventriculoperitoneal shunt revision-      PLAN:  Neuro:   Respiratory: patient instructed on incentive spirometer  CV:  Endocrine:   Heme/Onc:               DVT ppx: [] SQH [] SQL and venodynes bilaterally  Renal:   ID:   GI:    PT/OT:   Will discuss with ____    Spectralink # 62M PMH TBI s/p VPS, recently s/p VPS revision last week, discharged on Friday, presents to the ED with his family, complaining of worsening short term memory, new urinary incontinence since yesterday, and increasing difficulty walking. He reports very mild headache, otherewise is wide awake, no N/V. CTH done in the ED shows ventriculomegaly. (20 Aug 2017 22:54)    PAST MEDICAL & SURGICAL HISTORY:  Hydrocephalus  S/P  shunt, multiple revisions     PROCEDURE: 8/22/17 s/p Ventriculoperitoneal shunt replacement- strata valve @ 1.0       PLAN:  Neuro: monitor  mental status,   Respiratory: patient instructed on incentive spirometer              DVT ppx: [] SQH [] SQL and venodynes bilaterally, no chemoprophylaxis as ambulating  GI: monitor for ileus, increase ambulation and fluids, bowel regimen    PT/OT: no skilled PT needs      discussed with Dr Tanvi Foss # [id number]

## 2017-08-23 ENCOUNTER — TRANSCRIPTION ENCOUNTER (OUTPATIENT)
Age: 62
End: 2017-08-23

## 2017-08-23 VITALS
SYSTOLIC BLOOD PRESSURE: 155 MMHG | TEMPERATURE: 99 F | RESPIRATION RATE: 18 BRPM | HEART RATE: 69 BPM | DIASTOLIC BLOOD PRESSURE: 58 MMHG | OXYGEN SATURATION: 96 %

## 2017-08-23 NOTE — DISCHARGE NOTE ADULT - MEDICATION SUMMARY - MEDICATIONS TO TAKE
I will START or STAY ON the medications listed below when I get home from the hospital:    docusate sodium 100 mg oral capsule  -- 1 cap(s) by mouth 3 times a day  -- Indication: For Stool softner    senna oral tablet  -- 2 tab(s) by mouth once a day (at bedtime), As needed, Constipation  -- Indication: For Stool softner

## 2017-08-23 NOTE — DISCHARGE NOTE ADULT - CARE PROVIDERS DIRECT ADDRESSES
[email or web address]

## 2017-08-23 NOTE — DISCHARGE NOTE ADULT - HOSPITAL COURSE
62 year old male with PMH TBI s/p VPS, recently s/p VPS revision last week, discharged on Friday, presented to the ED with his family, complaining of worsening short term memory, new urinary incontinence since yesterday, and increasing difficulty walking. He reports very mild headache, otherwise is wide awake, no N/V. CTH done in the ED which showed ventriculomegaly. Patient was taken to OR for ventriculoperitoneal shunt replacement, replaced with Strata II valve/proximal and distal catheters, shunt at 1.0 on 8/21/17 without complications. Doing good after procedure with improvement with memory and gait. Patient found to have an ileus during this admission which improved with increased ambulations, fluids and aggressive bowel regimen and had multiple bowel movements, abdominal distention improved after having bowel movements. Neurologically stable and doing well. Patient was evaluated by PT/OT and recommended no skilled PT. Patient is stable and  cleared for discharge today by neurosurgery attending and team, discussed with Dr. Tinajero, advised to follow up as outpatient for further care.

## 2017-08-23 NOTE — DISCHARGE NOTE ADULT - CARE PLAN
Principal Discharge DX:	Hydrocephalus  Goal:	s/p replacement of VPS with Strata II valve/proximal and distal catheters, shunt at 1.0 on 8/21/17  Instructions for follow-up, activity and diet:	Please follow up with neurosurgery as an outpatient for further care, please call for an appointment upon discharge from the hospital.    No heavy lifting, straining, bending, twisting, driving until cleared by your doctor  Return to emergency department if you have fever, bleeding/discharge from the incision, if you have weakness, numbness, worsening symptoms, change in mental status, seizure like activity, nausea/vomiting, chest pain, shortness of breath  Secondary Diagnosis:	Bladder incontinence  Secondary Diagnosis:	S/P  shunt

## 2017-08-23 NOTE — PROGRESS NOTE ADULT - ASSESSMENT
62 year old male with PMH TBI s/p VPS, recently s/p VPS revision last week, discharged on Friday, presented to the ED with his family, complaining of worsening short term memory, new urinary incontinence since yesterday, and increasing difficulty walking. He reports very mild headache, otherwise is wide awake, no N/V. CTH done in the ED which showed ventriculomegaly. Patient was taken to OR for ventriculoperitoneal shunt replacement, replaced with Strata II valve/proximal and distal catheters, shunt at 1.0 on 8/21/17.        PLAN:  Neuro: Neurologically stable and doing well, continue PRN pain control   GI: Patient ileus improved with increased ambulations, fluids and aggressive bowel regimen and had multiple bowel movements  Dispo/Plan: PT/OT recommended no skilled PT  Patient is cleared for discharge today by neurosurgery attending and team, discussed with Dr. Tinajero, advised to follow up as outpatient for further care.     Remigio ANGLIN #[id number]

## 2017-08-23 NOTE — DISCHARGE NOTE ADULT - ADDITIONAL INSTRUCTIONS
Please follow up with neurosurgery as an outpatient for further care, please call for an appointment upon discharge from the hospital.    No heavy lifting, straining, bending, twisting, driving until cleared by your doctor  Return to emergency department if you have fever, bleeding/discharge from the incision, if you have weakness, numbness, worsening symptoms, change in mental status, seizure like activity, nausea/vomiting, chest pain, shortness of breath Please follow up with neurosurgery as an outpatient for further care, please call for an appointment upon discharge from the hospital for Thursday 8/23    No heavy lifting, straining, bending, twisting, driving until cleared by your doctor  Return to emergency department if you have fever, bleeding/discharge from the incision, if you have weakness, numbness, worsening symptoms, change in mental status, seizure like activity, nausea/vomiting, chest pain, shortness of breath

## 2017-08-23 NOTE — DISCHARGE NOTE ADULT - PLAN OF CARE
s/p replacement of VPS with Strata II valve/proximal and distal catheters, shunt at 1.0 on 8/21/17 Please follow up with neurosurgery as an outpatient for further care, please call for an appointment upon discharge from the hospital.    No heavy lifting, straining, bending, twisting, driving until cleared by your doctor  Return to emergency department if you have fever, bleeding/discharge from the incision, if you have weakness, numbness, worsening symptoms, change in mental status, seizure like activity, nausea/vomiting, chest pain, shortness of breath

## 2017-08-23 NOTE — DISCHARGE NOTE ADULT - REASON FOR ADMISSION
Worsening gait/urinary incontinence s/p replacement of VPS with Strata II valve/proximal and distal catheters, shunt at 1.0 on 8/21/17.

## 2017-08-23 NOTE — PROGRESS NOTE ADULT - SUBJECTIVE AND OBJECTIVE BOX
SUBJECTIVE: Patient seen and examined sitting in chair, no acute events overnight and doing great this morning, no complaints. Had a bowel movement, no abdominal pain or distention.    Vital Signs Last 24 Hrs  T(C): 37.3 (23 Aug 2017 08:37), Max: 37.4 (22 Aug 2017 15:04)  T(F): 99.1 (23 Aug 2017 08:37), Max: 99.3 (22 Aug 2017 15:04)  HR: 69 (23 Aug 2017 08:37) (69 - 76)  BP: 155/58 (23 Aug 2017 08:37) (134/72 - 161/78)  BP(mean): --  RR: 18 (23 Aug 2017 08:37) (18 - 18)  SpO2: 96% (23 Aug 2017 08:37) (96% - 99%)    PHYSICAL EXAM:    General: No Acute Distress     Neurological: Awake, alert oriented to person, place and time, Following Commands, PERRL, EOMI, Face Symmetrical, Speech Fluent, Moving all extremities, Muscle Strength normal in all four extremities, No Drift, Sensation to Light Touch Intact    Pulmonary: Clear to Auscultation, No Rales, No Rhonchi, No Wheezes     Cardiovascular: S1, S2, Regular Rate and Rhythm     Gastrointestinal: Soft, Nontender, Nondistended     Extremities: No calf tenderness     Incision: C/D/I    LABS: No new labs             08-22 @ 07:01  -  08-23 @ 07:00  --------------------------------------------------------  IN: 1460 mL / OUT: 0 mL / NET: 1460 mL      DRAINS:     MEDICATIONS:  Antibiotics:    Neuro:  acetaminophen  IVPB. 1000 milliGRAM(s) IV Intermittent once PRN Mild Pain (1 - 3)  oxyCODONE    5 mG/acetaminophen 325 mG 1 Tablet(s) Oral every 4 hours PRN Moderate Pain (4 - 6)  oxyCODONE    5 mG/acetaminophen 325 mG 2 Tablet(s) Oral every 4 hours PRN Severe Pain (7 - 10)  ondansetron Injectable 4 milliGRAM(s) IV Push every 6 hours PRN Nausea and/or Vomiting    Cardiac:    Pulm:    GI/:  docusate sodium 100 milliGRAM(s) Oral three times a day  bisacodyl Suppository 10 milliGRAM(s) Rectal daily PRN Constipation  senna 2 Tablet(s) Oral at bedtime    Other:     DIET: [x] Regular [] CCD [] Renal [] Puree [] Dysphagia [] Tube Feeds:     IMAGING:

## 2017-08-23 NOTE — DISCHARGE NOTE ADULT - INSTRUCTIONS
Regular diet  No heavy lifting, straining, bending, twisting, driving until cleared by your doctor  Return to emergency department if you have fever, bleeding/discharge from the incision, if you have weakness, numbness, worsening symptoms, change in mental status, seizure like activity, nausea/vomiting, chest pain, shortness of breath

## 2017-08-23 NOTE — DISCHARGE NOTE ADULT - CARE PROVIDER_API CALL
Luis Tinajero (DO), Neurological Surgery  [address] [place] [address]  Phone: [phone]  Fax: [phone]

## 2017-08-23 NOTE — DISCHARGE NOTE ADULT - PATIENT PORTAL LINK FT
“You can access the Follow[facility] Patient Portal, offered by [facility], by registering with the following website: [email or web address]”

## 2017-08-24 ENCOUNTER — TRANSCRIPTION ENCOUNTER (OUTPATIENT)
Age: 62
End: 2017-08-24

## 2017-08-24 ENCOUNTER — APPOINTMENT (OUTPATIENT)
Dept: NEUROSURGERY | Facility: CLINIC | Age: 62
End: 2017-08-24
Payer: COMMERCIAL

## 2017-08-24 PROCEDURE — 62252 CSF SHUNT REPROGRAM: CPT

## 2017-08-24 PROCEDURE — 99024 POSTOP FOLLOW-UP VISIT: CPT

## 2017-08-24 RX ORDER — CEFDINIR 300 MG/1
300 CAPSULE ORAL
Qty: 20 | Refills: 0 | Status: COMPLETED | COMMUNITY
Start: 2017-05-18

## 2017-08-24 RX ORDER — ACETAZOLAMIDE 250 MG/1
250 TABLET ORAL
Qty: 56 | Refills: 0 | Status: DISCONTINUED | COMMUNITY
Start: 2017-07-18 | End: 2017-08-24

## 2017-08-28 NOTE — DISCHARGE NOTE ADULT - PROVIDER RX CONTACT NUMBER
normal... Well appearing, well nourished, awake, alert, oriented to person, place, time/situation and in no apparent distress. [phone]

## 2017-08-29 ENCOUNTER — EMERGENCY (EMERGENCY)
Facility: HOSPITAL | Age: 62
LOS: 1 days | Discharge: HOME | End: 2017-08-29
Attending: EMERGENCY MEDICINE | Admitting: EMERGENCY MEDICINE
Payer: COMMERCIAL

## 2017-08-29 VITALS
SYSTOLIC BLOOD PRESSURE: 148 MMHG | TEMPERATURE: 98 F | WEIGHT: 242.07 LBS | HEART RATE: 71 BPM | HEIGHT: 75 IN | RESPIRATION RATE: 18 BRPM | OXYGEN SATURATION: 99 % | DIASTOLIC BLOOD PRESSURE: 93 MMHG

## 2017-08-29 VITALS
HEART RATE: 70 BPM | OXYGEN SATURATION: 98 % | SYSTOLIC BLOOD PRESSURE: 138 MMHG | DIASTOLIC BLOOD PRESSURE: 86 MMHG | RESPIRATION RATE: 18 BRPM

## 2017-08-29 DIAGNOSIS — Z98.2 PRESENCE OF CEREBROSPINAL FLUID DRAINAGE DEVICE: Chronic | ICD-10-CM

## 2017-08-29 LAB
-  CLINDAMYCIN: SIGNIFICANT CHANGE UP
ALBUMIN SERPL ELPH-MCNC: 4.1 G/DL — SIGNIFICANT CHANGE UP (ref 3.3–5)
ALP SERPL-CCNC: 83 U/L — SIGNIFICANT CHANGE UP (ref 40–120)
ALT FLD-CCNC: 16 U/L RC — SIGNIFICANT CHANGE UP (ref 10–45)
ANION GAP SERPL CALC-SCNC: 12 MMOL/L — SIGNIFICANT CHANGE UP (ref 5–17)
APPEARANCE CSF: ABNORMAL
APTT BLD: 32.9 SEC — SIGNIFICANT CHANGE UP (ref 27.5–37.4)
AST SERPL-CCNC: 21 U/L — SIGNIFICANT CHANGE UP (ref 10–40)
BILIRUB SERPL-MCNC: 0.6 MG/DL — SIGNIFICANT CHANGE UP (ref 0.2–1.2)
BUN SERPL-MCNC: 14 MG/DL — SIGNIFICANT CHANGE UP (ref 7–23)
CALCIUM SERPL-MCNC: 9.3 MG/DL — SIGNIFICANT CHANGE UP (ref 8.4–10.5)
CHLORIDE SERPL-SCNC: 102 MMOL/L — SIGNIFICANT CHANGE UP (ref 96–108)
CO2 SERPL-SCNC: 27 MMOL/L — SIGNIFICANT CHANGE UP (ref 22–31)
COLOR CSF: ABNORMAL
CREAT SERPL-MCNC: 0.88 MG/DL — SIGNIFICANT CHANGE UP (ref 0.5–1.3)
CULTURE RESULTS: SIGNIFICANT CHANGE UP
GLUCOSE CSF-MCNC: 62 MG/DL — SIGNIFICANT CHANGE UP (ref 40–70)
GLUCOSE SERPL-MCNC: 98 MG/DL — SIGNIFICANT CHANGE UP (ref 70–99)
GRAM STN FLD: SIGNIFICANT CHANGE UP
HCT VFR BLD CALC: 45.4 % — SIGNIFICANT CHANGE UP (ref 39–50)
HGB BLD-MCNC: 16 G/DL — SIGNIFICANT CHANGE UP (ref 13–17)
INR BLD: 1.06 RATIO — SIGNIFICANT CHANGE UP (ref 0.88–1.16)
LACTATE CSF-MCNC: 2.8 MMOL/L — HIGH (ref 1.1–2.4)
LYMPHOCYTES # CSF: 45 % — SIGNIFICANT CHANGE UP (ref 40–80)
MCHC RBC-ENTMCNC: 33 PG — SIGNIFICANT CHANGE UP (ref 27–34)
MCHC RBC-ENTMCNC: 35.3 GM/DL — SIGNIFICANT CHANGE UP (ref 32–36)
MCV RBC AUTO: 93.4 FL — SIGNIFICANT CHANGE UP (ref 80–100)
METHOD TYPE: SIGNIFICANT CHANGE UP
MONOS+MACROS NFR CSF: 21 % — SIGNIFICANT CHANGE UP (ref 15–45)
NEUTROPHILS # CSF: 34 % — HIGH (ref 0–6)
NRBC NFR CSF: 10 /UL — HIGH (ref 0–5)
ORGANISM # SPEC MICROSCOPIC CNT: SIGNIFICANT CHANGE UP
ORGANISM # SPEC MICROSCOPIC CNT: SIGNIFICANT CHANGE UP
PLATELET # BLD AUTO: 195 K/UL — SIGNIFICANT CHANGE UP (ref 150–400)
POTASSIUM SERPL-MCNC: 4.6 MMOL/L — SIGNIFICANT CHANGE UP (ref 3.5–5.3)
POTASSIUM SERPL-SCNC: 4.6 MMOL/L — SIGNIFICANT CHANGE UP (ref 3.5–5.3)
PROT CSF-MCNC: 47 MG/DL — HIGH (ref 15–45)
PROT SERPL-MCNC: 7 G/DL — SIGNIFICANT CHANGE UP (ref 6–8.3)
PROTHROM AB SERPL-ACNC: 11.5 SEC — SIGNIFICANT CHANGE UP (ref 9.8–12.7)
RBC # BLD: 4.86 M/UL — SIGNIFICANT CHANGE UP (ref 4.2–5.8)
RBC # CSF: 8875 /UL — HIGH (ref 0–0)
RBC # FLD: 12.1 % — SIGNIFICANT CHANGE UP (ref 10.3–14.5)
SODIUM SERPL-SCNC: 141 MMOL/L — SIGNIFICANT CHANGE UP (ref 135–145)
SPECIMEN SOURCE: SIGNIFICANT CHANGE UP
SPECIMEN SOURCE: SIGNIFICANT CHANGE UP
TUBE TYPE: SIGNIFICANT CHANGE UP
WBC # BLD: 9.8 K/UL — SIGNIFICANT CHANGE UP (ref 3.8–10.5)
WBC # FLD AUTO: 9.8 K/UL — SIGNIFICANT CHANGE UP (ref 3.8–10.5)

## 2017-08-29 PROCEDURE — 87184 SC STD DISK METHOD PER PLATE: CPT

## 2017-08-29 PROCEDURE — 84157 ASSAY OF PROTEIN OTHER: CPT

## 2017-08-29 PROCEDURE — 80053 COMPREHEN METABOLIC PANEL: CPT

## 2017-08-29 PROCEDURE — 89051 BODY FLUID CELL COUNT: CPT

## 2017-08-29 PROCEDURE — 99284 EMERGENCY DEPT VISIT MOD MDM: CPT

## 2017-08-29 PROCEDURE — 85027 COMPLETE CBC AUTOMATED: CPT

## 2017-08-29 PROCEDURE — 87205 SMEAR GRAM STAIN: CPT

## 2017-08-29 PROCEDURE — 83605 ASSAY OF LACTIC ACID: CPT

## 2017-08-29 PROCEDURE — 87070 CULTURE OTHR SPECIMN AEROBIC: CPT

## 2017-08-29 PROCEDURE — 99244 OFF/OP CNSLTJ NEW/EST MOD 40: CPT | Mod: GC

## 2017-08-29 PROCEDURE — 85730 THROMBOPLASTIN TIME PARTIAL: CPT

## 2017-08-29 PROCEDURE — 82945 GLUCOSE OTHER FLUID: CPT

## 2017-08-29 PROCEDURE — 85610 PROTHROMBIN TIME: CPT

## 2017-08-29 RX ORDER — SODIUM CHLORIDE 9 MG/ML
3 INJECTION INTRAMUSCULAR; INTRAVENOUS; SUBCUTANEOUS ONCE
Qty: 0 | Refills: 0 | Status: COMPLETED | OUTPATIENT
Start: 2017-08-29 | End: 2017-08-29

## 2017-08-29 RX ADMIN — SODIUM CHLORIDE 3 MILLILITER(S): 9 INJECTION INTRAMUSCULAR; INTRAVENOUS; SUBCUTANEOUS at 14:15

## 2017-08-29 NOTE — ED ADULT NURSE NOTE - CHPI ED SYMPTOMS NEG
no confusion/no fever/no blurred vision/no loss of consciousness/no change in level of consciousness/no weakness/no nausea/no dizziness/no vomiting

## 2017-08-29 NOTE — ED SUB INTERN NOTE - GASTROINTESTINAL, MLM
Abdomen soft, non-tender, no rebound, no guarding. Abdomen soft, non-tender, no rebound, no guarding. VPS abdominal scar is closed, mildly indurated with warmth and erythema surrounding the incision, no drainage

## 2017-08-29 NOTE — ED SUB INTERN NOTE - FAMILY HISTORY
Father  Still living? Unknown  Family history of diabetes insipidus, Age at diagnosis: Age Unknown  Family history of cancer in father, Age at diagnosis: Age Unknown  Family history of heart disease, Age at diagnosis: Age Unknown     Mother  Still living? Unknown  Family history of diabetes insipidus, Age at diagnosis: Age Unknown  Family history of cancer in mother, Age at diagnosis: Age Unknown  Family history of heart disease, Age at diagnosis: Age Unknown

## 2017-08-29 NOTE — ED ADULT NURSE NOTE - OBJECTIVE STATEMENT
62M comes to ED after being told he has "positive CSF cultures". Patient had spinal tap performed and he was called last night to come to ED. He has PMH  shunt for hydrocephalus since early 2000s. He recently was admitted for "two shunt revisions". He was experiencing vomiting, abnormal gait and short term memory loss. Patients only complaint today in ED is mild headche 4/10 which he refuses pain medication. PMH spinal stenosis and herniated lumbar discs which cause left leg numbness and tingling. He denies SOB/chest pain/N/V/D/dizziness/abdominal pain/fever/chills/urinary symptoms. On exam, clear lungs, abdomen soft and nontender, neurologically intact, no edema. Will continue to monitor. 62M comes to ED after being told he has "positive CSF cultures". Patient had spinal tap performed and he was called last night to come to ED. He has PMH  shunt for hydrocephalus since early 2000s. He recently was admitted for "two shunt revisions". He was experiencing vomiting, abnormal gait and short term memory loss. Patients only complaint today in ED is mild headache 4/10 which he refuses pain medication. PMH spinal stenosis and herniated lumbar discs which cause left leg numbness and tingling. He denies SOB/chest pain/N/V/D/dizziness/abdominal pain/fever/chills/urinary symptoms. On exam, clear lungs, abdomen soft and nontender, neurologically intact, no edema. Will continue to monitor.

## 2017-08-29 NOTE — ED PROVIDER NOTE - ATTENDING CONTRIBUTION TO CARE
62 year old male called back by Neurosurgery due to pos cx from csf, neurological exam is benign, pt asymptomatic now. d/w Neurosurgery - csf drawn prior to replacement of hardware, id consult - cleared pt no abx for now - Neurosurgery caroline csf for re-culture. Neurosurgery rec for ct then decided not needed.

## 2017-08-29 NOTE — CONSULT NOTE ADULT - ASSESSMENT
Patient sp shunt replacement 8/22 with cultures that had grown p acnes.    -ID states can be followed up outpatient  -Fu culltures
62M with PMH hydrocephalus 2/2 traumatic brain injury s/p VPS placed in 2001 who underwent VPS revision on 8/18 and 8/22 (presented with worsening gait, urinary incontinence and memory problems both admissions). He presents back after his CSF analysis revealed gram variable rods on culture. Of note, the patient underwent complete revision of the VPS AFTER the positive culture for variable rods on 8/21. Cell counts from CSF today without evidence of infection. Will need to monitor culture results to see if they return as positive once more from today's CSF culture.  --OK to discharge as patient has had shunt revision after the 8/21 cultures were obtained  --Followup final cultures from 8/21 and from CSF cultures drawn today

## 2017-08-29 NOTE — ED SUB INTERN NOTE - MUSCULOSKELETAL, MLM
Spine appears normal, range of motion is not limited, no muscle or joint tenderness, negative straight-leg test

## 2017-08-29 NOTE — ED PROVIDER NOTE - PROGRESS NOTE DETAILS
d/w Neurosurgery, requested ct brain, will see pt, no abx now Neurosurgery at bedside, now states that they do not want ct brain pt seen by ID, Dr. Davis and neuro sx. pt will be sent home to f/u outpt

## 2017-08-29 NOTE — CONSULT NOTE ADULT - ATTENDING COMMENTS
clinically well-  CSF values acceptable given recent procedure  Patient had complete revision AFTER last +CSF culture    Suggest: Monitor follow up CSF culture to assure elimination of previous P acne isolate  discussed with Neurosurgery, ED staff as well as patient and his wife.

## 2017-08-29 NOTE — ED SUB INTERN NOTE - GENERIC SYMPTOMS NEGATIVE FT
No nausea, no vomiting, no seizures, no weakness, no sensory deficits, no chest pain, no SOB, no abd pain

## 2017-08-29 NOTE — ED PROVIDER NOTE - OBJECTIVE STATEMENT
This is a 61 yo patient with PMH significant for hydrocephalus s/p VPS in 2001 with 7 revisions who recently underwent 2 neurosurgeries last week. CSF analysis revealed gram negative rods, and patient was instructed to return to the ED for a repeat tap. The patient complains of a dull bifrontal headache 5/10 in severity that began a few days ago. He reports that this headache is different than in the past. He denies any nausea, vomiting, bowel or bladder incontinence, focal neurological deficits, weakness, syncope, seizures, chest pain, or SOB.

## 2017-08-29 NOTE — CONSULT NOTE ADULT - SUBJECTIVE AND OBJECTIVE BOX
p ([id number])     HPI: 63 yo male with history of VPS in 2001 with revision on 8/18 (partial replacement) and 8/22 (complete replacement).  His cultures from 8/15 grew p acnes.  No fevers, HA, chills, gait instability, urinary incontinence.     PAST MEDICAL HISTORY   Hydrocephalus    PAST SURGICAL HISTORY   S/P  shunt        MEDICATIONS:  Antibiotics:    Neuro:    Anticoagulation:    Other:      SOCIAL HISTORY:   Occupation:   Marital Status:     FAMILY HISTORY:  Family history of heart disease (Father, Mother)  Family history of cancer in mother (Mother)  Family history of cancer in father (Father)  Family history of diabetes insipidus (Father, Mother)      REVIEW OF SYSTEMS:  Check here if all are normal other than Neurological []  General:  Eyes:  ENT:  Cardiac:  Respiratory:  GI:  Musculoskeletal:   Skin:  Neurologic:   Psychiatric:     PHYSICAL EXAMINATION:   T(C): 36.4 (08-29-17 @ 13:21), Max: 36.4 (08-29-17 @ 13:21)  HR: 71 (08-29-17 @ 13:21) (71 - 71)  BP: 148/93 (08-29-17 @ 13:21) (148/93 - 148/93)  RR: 18 (08-29-17 @ 13:21) (18 - 18)  SpO2: 99% (08-29-17 @ 13:21) (99% - 99%)  Wt(kg): --Height (cm): 190.5 (08-29 @ 13:21)  Weight (kg): 109.8 (08-29 @ 13:21)    General Examination:     Neurologic Examination:             Higher functions                 Normal [x]               Abnormal:      Cranial Nerves (ii-xii)           Normal [x]              Abnormal:     Motor Exam                       Normal [x]              Abnormal:                               Sensory Exam                   Normal [x]              Abnormal:    Reflexes                            Normal [x]              Abnormal:     Coordination                      Normal [x]              Abnormal:    Other:     LABS:                        16.0   9.8   )-----------( 195      ( 29 Aug 2017 14:25 )             45.4     08-29    141  |  102  |  14  ----------------------------<  98  4.6   |  27  |  0.88    Ca    9.3      29 Aug 2017 14:25    TPro  7.0  /  Alb  4.1  /  TBili  0.6  /  DBili  x   /  AST  21  /  ALT  16  /  AlkPhos  83  08-29    PT/INR - ( 29 Aug 2017 14:25 )   PT: 11.5 sec;   INR: 1.06 ratio         PTT - ( 29 Aug 2017 14:25 )  PTT:32.9 sec      RADIOLOGY & ADDITIONAL STUDIES:  N/a

## 2017-08-29 NOTE — CONSULT NOTE ADULT - SUBJECTIVE AND OBJECTIVE BOX
HPI:    62M with PMH hydrocephalus 2/2 traumatic brain injury s/p VPS placed in 2001 who underwent VPS revision on 8/18 and 8/22 (presented with worsening gait, urinary incontinence and memory problems both admissions). He presents back after his CSF analysis revealed gram variable rods on culture. The patient noted dull bifrontal headache which began several days ago. He also notes that the current headache is different than those in the past. No chills or fevers.     PAST MEDICAL & SURGICAL HISTORY:  Hydrocephalus  S/P  shunt    Allergies  No Known Allergies    ANTIMICROBIALS:      OTHER MEDS: MEDICATIONS  (STANDING):    SOCIAL HISTORY:  [ ] etoh [ ] tobacco [ ] former smoker [ ] IVDU    FAMILY HISTORY:  Family history of heart disease (Father, Mother)  Family history of cancer in mother (Mother)  Family history of cancer in father (Father)  Family history of diabetes insipidus (Father, Mother)      REVIEW OF SYSTEMS  [  ] ROS unobtainable because:    [  ] All other systems negative except as noted below:	    Constitutional:  [ ] fever [ ] weight loss  Skin:  [ ] rash [ ] phlebitis	  Eyes: [ ] icterus [ ] inflammation	  ENMT: [ ] discharge [ ] thrush [ ] ulcers [ ] exudates  Respiratory: [ ] dyspnea [ ] hemoptysis [ ] cough [ ] sputum	  Cardiovascular:  [ ] chest pain [ ] palpitations [ ] edema	  Gastrointestinal:  [ ] nausea [ ] vomiting [ ] diarrhea [ ] constipation [ ] pain	  Genitourinary:  [ ] dysuria [ ] frequency [ ] hematuria [ ] discharge [ ] flank pain  Musculoskeletal:  [ ] myalgias [ ] arthralgias [ ] arthritis	  Neurological:  [ ] headache [ ] seizures	  Psychiatric:  [ ] anxiety [ ] depression	  Hematology/Lymphatics:  [ ] lymphadenopathy  Endocrine:  [ ] adrenal [ ] thyroid  Allergic/Immunologic:	 [ ] transplant [ ] seasonal    Vital Signs Last 24 Hrs  T(F): 97.6 (08-29-17 @ 13:21), Max: 99.1 (08-23-17 @ 08:37)    Vital Signs Last 24 Hrs  HR: 71 (08-29-17 @ 13:21) (71 - 71)  BP: 148/93 (08-29-17 @ 13:21) (148/93 - 148/93)  RR: 18 (08-29-17 @ 13:21)  SpO2: 99% (08-29-17 @ 13:21) (99% - 99%)  Wt(kg): --    PHYSICAL EXAM:  General: non-toxic  HEAD/EYES: anicteric, PERRL  ENT:  supple  Cardiovascular:   S1, S2  Respiratory:  clear bilaterally  GI:  soft, non-tender, normal bowel sounds  :  no CVA tenderness   Musculoskeletal:  no synovitis  Neurologic:  grossly non-focal  Skin:  no rash  Lymph: no lymphadenopathy  Psychiatric:  appropriate affect  Vascular:  no phlebitis                        16.0   9.8   )-----------( 195      ( 29 Aug 2017 14:25 )             45.4       08-29    141  |  102  |  14  ----------------------------<  98  4.6   |  27  |  0.88    Ca    9.3      29 Aug 2017 14:25    TPro  7.0  /  Alb  4.1  /  TBili  0.6  /  DBili  x   /  AST  21  /  ALT  16  /  AlkPhos  83  08-29    MICROBIOLOGY:    (8/21/2017) Nucleated CSF count 2, RBC 2,220, Protein 22, Glucose 73.   (8/29/2017) Protein 47, Glucose 62.     Culture - CSF with Gram Stain . (08.21.17 @ 16:46)    Gram Stain:   No polymorphonuclear cells seen  No organisms seen by cytocentrifuge    Specimen Source: .CSF CSF    Culture Results:   Growth in fluid media only Gram Variable Rods    Culture - CSF with Gram Stain . (08.15.17 @ 12:43)    Gram Stain:   No polymorphonuclear leukocytes  No organisms seen by cytocentrifuge    -  Clindamycin: S 0.094    Specimen Source: .CSF CSF    Culture Results:   Moderate Propionibacterium acnes    Organism Identification: Propionibacterium acnes    Organism: Propionibacterium acnes    Method Type: ETEST    RADIOLOGY:    EXAM:  CT BRAIN                        PROCEDURE DATE:  08/22/2017    After revision of the right frontal shunt catheter there is   decreasing ventricular size compared with 8/21/2017. Small amount of   intraventricular hemorrhage is identified. There is no change in the   hemorrhage in the right frontal lobe in the area of the prior   encephalomalacia. HPI:    62M with PMH hydrocephalus 2/2 traumatic brain injury s/p VPS placed in 2001 who underwent VPS revision on 8/18 and 8/22 (presented with worsening gait, urinary incontinence and memory problems both admissions). He presents back after his CSF analysis revealed gram variable rods on culture. Of note, the patient underwent complete revision of the VPS AFTER the positive culture for variable rods on 8/21. The patient was called to the ED to determine if there was involvement of the current shunt. The patient denies symptoms of headache, photophobia or N/V. No chills or fevers. No cough, SOB    PAST MEDICAL & SURGICAL HISTORY:  Hydrocephalus  S/P  shunt    Allergies  No Known Allergies    ANTIMICROBIALS:      OTHER MEDS: MEDICATIONS  (STANDING):    SOCIAL HISTORY:  [ ] etoh [ ] tobacco [ ] former smoker [ ] IVDU    FAMILY HISTORY:  Family history of heart disease (Father, Mother)  Family history of cancer in mother (Mother)  Family history of cancer in father (Father)  Family history of diabetes insipidus (Father, Mother)      REVIEW OF SYSTEMS  [  ] ROS unobtainable because:    [  ] All other systems negative except as noted below:	    Constitutional:  [ ] fever [ ] weight loss  Skin:  [ ] rash [ ] phlebitis	  Eyes: [ ] icterus [ ] inflammation	  ENMT: [ ] discharge [ ] thrush [ ] ulcers [ ] exudates  Respiratory: [ ] dyspnea [ ] hemoptysis [ ] cough [ ] sputum	  Cardiovascular:  [ ] chest pain [ ] palpitations [ ] edema	  Gastrointestinal:  [ ] nausea [ ] vomiting [ ] diarrhea [ ] constipation [ ] pain	  Genitourinary:  [ ] dysuria [ ] frequency [ ] hematuria [ ] discharge [ ] flank pain  Musculoskeletal:  [ ] myalgias [ ] arthralgias [ ] arthritis	  Neurological:  [ ] headache [ ] seizures	  Psychiatric:  [ ] anxiety [ ] depression	  Hematology/Lymphatics:  [ ] lymphadenopathy  Endocrine:  [ ] adrenal [ ] thyroid  Allergic/Immunologic:	 [ ] transplant [ ] seasonal    Vital Signs Last 24 Hrs  T(F): 97.6 (08-29-17 @ 13:21), Max: 99.1 (08-23-17 @ 08:37)    Vital Signs Last 24 Hrs  HR: 71 (08-29-17 @ 13:21) (71 - 71)  BP: 148/93 (08-29-17 @ 13:21) (148/93 - 148/93)  RR: 18 (08-29-17 @ 13:21)  SpO2: 99% (08-29-17 @ 13:21) (99% - 99%)  Wt(kg): --    PHYSICAL EXAM:  General: non-toxic  HEAD/EYES: anicteric, PERRL  ENT:  supple  Cardiovascular:   S1, S2  Respiratory:  clear bilaterally  GI:  soft, non-tender, normal bowel sounds  :  no CVA tenderness   Musculoskeletal:  no synovitis  Neurologic:  grossly non-focal  Skin:  no rash  Lymph: no lymphadenopathy  Psychiatric:  appropriate affect  Vascular:  no phlebitis                        16.0   9.8   )-----------( 195      ( 29 Aug 2017 14:25 )             45.4       08-29    141  |  102  |  14  ----------------------------<  98  4.6   |  27  |  0.88    Ca    9.3      29 Aug 2017 14:25    TPro  7.0  /  Alb  4.1  /  TBili  0.6  /  DBili  x   /  AST  21  /  ALT  16  /  AlkPhos  83  08-29    MICROBIOLOGY:    (8/21/2017) Nucleated CSF count 2, RBC 2,220, Protein 22, Glucose 73.   (8/29/2017) Protein 47, Glucose 62.     Culture - CSF with Gram Stain . (08.21.17 @ 16:46)    Gram Stain:   No polymorphonuclear cells seen  No organisms seen by cytocentrifuge    Specimen Source: .CSF CSF    Culture Results:   Growth in fluid media only Gram Variable Rods    Culture - CSF with Gram Stain . (08.15.17 @ 12:43)    Gram Stain:   No polymorphonuclear leukocytes  No organisms seen by cytocentrifuge    -  Clindamycin: S 0.094    Specimen Source: .CSF CSF    Culture Results:   Moderate Propionibacterium acnes    Organism Identification: Propionibacterium acnes    Organism: Propionibacterium acnes    Method Type: ETEST    RADIOLOGY:    EXAM:  CT BRAIN                        PROCEDURE DATE:  08/22/2017    After revision of the right frontal shunt catheter there is   decreasing ventricular size compared with 8/21/2017. Small amount of   intraventricular hemorrhage is identified. There is no change in the   hemorrhage in the right frontal lobe in the area of the prior   encephalomalacia. HPI:    62M with PMH hydrocephalus 2/2 traumatic brain injury s/p VPS placed in 2001 who underwent VPS revision on 8/18 and 8/22 (presented with worsening gait, urinary incontinence and memory problems both admissions). He presents back after his CSF analysis revealed gram variable rods on culture. Of note, the patient underwent complete revision of the VPS AFTER the positive culture for variable rods on 8/21. The patient was called to the ED to determine if there was involvement of the current shunt. The patient denies symptoms of headache, photophobia or N/V. No chills or fevers. No cough, SOB. No urinary symptoms.     PAST MEDICAL & SURGICAL HISTORY:  Hydrocephalus  S/P  shunt    Allergies  No Known Allergies    ANTIMICROBIALS:      OTHER MEDS: MEDICATIONS  (STANDING):    SOCIAL HISTORY:  No smoking history. Social EtOH use. No drug use. No recent travel. Worked as a chiropractor in the past    FAMILY HISTORY:  Family history of heart disease (Father, Mother)  Family history of cancer in mother (Mother)  Family history of cancer in father (Father)  Family history of diabetes insipidus (Father, Mother)      REVIEW OF SYSTEMS  [  ] ROS unobtainable because:    [ x ] All other systems negative except as noted below:	    Constitutional:  [ ] fever [ ] weight loss  Skin:  [ ] rash [ ] phlebitis	  Eyes: [ ] icterus [ ] inflammation	  ENMT: [ ] discharge [ ] thrush [ ] ulcers [ ] exudates  Respiratory: [ ] dyspnea [ ] hemoptysis [ ] cough [ ] sputum	  Cardiovascular:  [ ] chest pain [ ] palpitations [ ] edema	  Gastrointestinal:  [ ] nausea [ ] vomiting [ ] diarrhea [ ] constipation [ ] pain	  Genitourinary:  [ ] dysuria [ ] frequency [ ] hematuria [ ] discharge [ ] flank pain  Musculoskeletal:  [ ] myalgias [ ] arthralgias [ ] arthritis	  Neurological:  [ ] headache [ ] seizures	  Psychiatric:  [ ] anxiety [ ] depression	  Hematology/Lymphatics:  [ ] lymphadenopathy  Endocrine:  [ ] adrenal [ ] thyroid  Allergic/Immunologic:	 [ ] transplant [ ] seasonal    Vital Signs Last 24 Hrs  T(F): 97.6 (08-29-17 @ 13:21), Max: 99.1 (08-23-17 @ 08:37)    Vital Signs Last 24 Hrs  HR: 71 (08-29-17 @ 13:21) (71 - 71)  BP: 148/93 (08-29-17 @ 13:21) (148/93 - 148/93)  RR: 18 (08-29-17 @ 13:21)  SpO2: 99% (08-29-17 @ 13:21) (99% - 99%)  Wt(kg): --    PHYSICAL EXAMINATION:  General: AAO3X, NAD  HEENT: VPS access site on the head without surrounding erythema or drainage, PERRL, EOMI, No subconjunctival hemorrhages, Oropharynx Clear, MMM  Neck: Supple, No BERTRAND  Cardiac: RRR, No M/R/G  Resp: CTAB, No Wh/Rh/Ra  Abdomen: +Surgical scars from VPS peritoneal portion, NBS, NT/ND, No HSM, No rigidity or guarding  MSK: No LE edema. No stigmata of IE. No evidence of phlebitis. No evidence of synovitis.  Back: No CVA Tenderness  Skin: No rashes or lesions. Skin is warm and dry to the touch.   Neuro: AAO3X. CN 2-12 Grossly intact. Moves all four extremities spontaneously.  Psych: Calm, Pleasant, Cooperative               16.0   9.8   )-----------( 195      ( 29 Aug 2017 14:25 )             45.4       08-29    141  |  102  |  14  ----------------------------<  98  4.6   |  27  |  0.88    Ca    9.3      29 Aug 2017 14:25    TPro  7.0  /  Alb  4.1  /  TBili  0.6  /  DBili  x   /  AST  21  /  ALT  16  /  AlkPhos  83  08-29    MICROBIOLOGY:    (8/21/2017) Nucleated CSF count 2, RBC 2,220, Protein 22, Glucose 73.   (8/29/2017) Protein 47, Glucose 62, Nucleated cells 10    Culture - CSF with Gram Stain . (08.21.17 @ 16:46)    Gram Stain:   No polymorphonuclear cells seen  No organisms seen by cytocentrifuge    Specimen Source: .CSF CSF    Culture Results:   Growth in fluid media only Gram Variable Rods    Culture - CSF with Gram Stain . (08.15.17 @ 12:43)    Gram Stain:   No polymorphonuclear leukocytes  No organisms seen by cytocentrifuge    -  Clindamycin: S 0.094    Specimen Source: .CSF CSF    Culture Results:   Moderate Propionibacterium acnes    Organism Identification: Propionibacterium acnes    Organism: Propionibacterium acnes    Method Type: ETEST    RADIOLOGY:    EXAM:  CT BRAIN                        PROCEDURE DATE:  08/22/2017    After revision of the right frontal shunt catheter there is   decreasing ventricular size compared with 8/21/2017. Small amount of   intraventricular hemorrhage is identified. There is no change in the   hemorrhage in the right frontal lobe in the area of the prior   encephalomalacia.

## 2017-08-29 NOTE — ED ADULT TRIAGE NOTE - CHIEF COMPLAINT QUOTE
Called back by Dr. Cadena for abnormal result of cerebrospinal fluid, with headache and sensitivity to light

## 2017-08-29 NOTE — ED SUB INTERN NOTE - OBJECTIVE STATEMENT FT
This is a 63 yo patient with PMH significant for hydrocephalus s/p VPS in 2001 with 7 revisions who recently underwent 2 neurosurgeries last week. CSF analysis revealed gram negative rods, and patient was instructed to return to the ED for a repeat tap. The patient complains of a dull bifrontal headache 5/10 in severity that began a few days ago. He reports that this headache is different than in the past. He denies any nausea, vomiting, bowel or bladder incontinence, focal neurological deficits, weakness, syncope, seizures, chest pain, or SOB.

## 2017-08-30 ENCOUNTER — APPOINTMENT (OUTPATIENT)
Dept: NEUROSURGERY | Facility: CLINIC | Age: 62
End: 2017-08-30

## 2017-08-31 LAB
-  AMOXICILLIN/CLAVULANIC ACID: SIGNIFICANT CHANGE UP
-  CEFOTETAN: SIGNIFICANT CHANGE UP
-  CEFTRIAXONE: SIGNIFICANT CHANGE UP
-  CEFTRIAXONE: SIGNIFICANT CHANGE UP
-  CLINDAMYCIN: SIGNIFICANT CHANGE UP
-  CLINDAMYCIN: SIGNIFICANT CHANGE UP
-  ERYTHROMYCIN: SIGNIFICANT CHANGE UP
-  IMIPENEM: SIGNIFICANT CHANGE UP
-  METRONIDAZOLE: SIGNIFICANT CHANGE UP
-  PENICILLIN: SIGNIFICANT CHANGE UP
-  VANCOMYCIN: SIGNIFICANT CHANGE UP
CULTURE RESULTS: SIGNIFICANT CHANGE UP
CULTURE RESULTS: SIGNIFICANT CHANGE UP
METHOD TYPE: SIGNIFICANT CHANGE UP
ORGANISM # SPEC MICROSCOPIC CNT: SIGNIFICANT CHANGE UP
SPECIMEN SOURCE: SIGNIFICANT CHANGE UP
SPECIMEN SOURCE: SIGNIFICANT CHANGE UP

## 2017-08-31 NOTE — ED POST DISCHARGE NOTE - ADDITIONAL DOCUMENTATION
spoke to pt. very upset with lack of message left and the telegram. Reports speaking to Dr. Glover this am around 11 and given the news "all is good from  your lumbar puncture". Pt advised to come back to the ER immediately but pt wants to get in touch with Dr Glover and will decide. Understands the risks and results repeated to the pt -PA Ernst Pina

## 2017-08-31 NOTE — ED POST DISCHARGE NOTE - DETAILS
called left message for pt on his business number, voicemail at home is full.  Will send telegram immediately due to natue of the culture.  Bill ANGLIN  Discussed with Dr Hinson

## 2017-08-31 NOTE — ED POST DISCHARGE NOTE - OTHER COMMUNICATION
9/2/17: Final Results of CSF w/ sensitivities available, patient advised of specific culture results. Spoke to patient who is aware that recommendation is to return to ED for further management at this time as was previously advised. Patient is a former MD understands risks of pathology, he has spoken to ID and neurosurgery doctors since d/c who were not concerned. Patient will follow up next week asked that results get faxed to both Dr. Davis and Dr. Tinajero, he states that he will follow their recommendations of management. Still displeased with nature of prior contact. Will fax reports at this time. Jessenia Cedillo PA-C

## 2017-09-01 LAB
CULTURE RESULTS: SIGNIFICANT CHANGE UP
ORGANISM # SPEC MICROSCOPIC CNT: SIGNIFICANT CHANGE UP

## 2017-09-05 ENCOUNTER — APPOINTMENT (OUTPATIENT)
Dept: NEUROSURGERY | Facility: CLINIC | Age: 62
End: 2017-09-05
Payer: MEDICARE

## 2017-09-05 VITALS
HEART RATE: 82 BPM | BODY MASS INDEX: 32.74 KG/M2 | DIASTOLIC BLOOD PRESSURE: 76 MMHG | SYSTOLIC BLOOD PRESSURE: 138 MMHG | OXYGEN SATURATION: 95 % | HEIGHT: 73 IN | WEIGHT: 247 LBS | TEMPERATURE: 98.7 F

## 2017-09-05 PROCEDURE — 99024 POSTOP FOLLOW-UP VISIT: CPT

## 2017-10-13 ENCOUNTER — RX RENEWAL (OUTPATIENT)
Age: 62
End: 2017-10-13

## 2017-10-13 DIAGNOSIS — N52.9 MALE ERECTILE DYSFUNCTION, UNSPECIFIED: ICD-10-CM

## 2017-11-08 ENCOUNTER — APPOINTMENT (OUTPATIENT)
Dept: FAMILY MEDICINE | Facility: CLINIC | Age: 62
End: 2017-11-08
Payer: COMMERCIAL

## 2017-11-08 VITALS
SYSTOLIC BLOOD PRESSURE: 128 MMHG | HEIGHT: 73 IN | HEART RATE: 76 BPM | WEIGHT: 252 LBS | OXYGEN SATURATION: 97 % | TEMPERATURE: 98.9 F | DIASTOLIC BLOOD PRESSURE: 64 MMHG | BODY MASS INDEX: 33.4 KG/M2 | RESPIRATION RATE: 16 BRPM

## 2017-11-08 PROCEDURE — 99214 OFFICE O/P EST MOD 30 MIN: CPT

## 2017-11-08 RX ORDER — OXYCODONE AND ACETAMINOPHEN 5; 325 MG/1; MG/1
5-325 TABLET ORAL
Qty: 42 | Refills: 0 | Status: COMPLETED | COMMUNITY
Start: 2017-08-18 | End: 2017-11-08

## 2017-11-08 RX ORDER — ALBUTEROL SULFATE 90 UG/1
108 (90 BASE) AEROSOL, METERED RESPIRATORY (INHALATION)
Qty: 8 | Refills: 0 | Status: COMPLETED | COMMUNITY
Start: 2017-05-18 | End: 2017-11-08

## 2017-11-08 RX ORDER — POTASSIUM CHLORIDE 1500 MG/1
20 TABLET, EXTENDED RELEASE ORAL
Qty: 28 | Refills: 0 | Status: COMPLETED | COMMUNITY
Start: 2017-07-18 | End: 2017-11-08

## 2017-11-16 ENCOUNTER — OTHER (OUTPATIENT)
Age: 62
End: 2017-11-16

## 2017-11-17 ENCOUNTER — OTHER (OUTPATIENT)
Age: 62
End: 2017-11-17

## 2018-01-09 ENCOUNTER — MEDICATION RENEWAL (OUTPATIENT)
Age: 63
End: 2018-01-09

## 2018-01-20 ENCOUNTER — APPOINTMENT (OUTPATIENT)
Dept: FAMILY MEDICINE | Facility: CLINIC | Age: 63
End: 2018-01-20
Payer: COMMERCIAL

## 2018-01-20 VITALS
BODY MASS INDEX: 33 KG/M2 | HEIGHT: 73 IN | SYSTOLIC BLOOD PRESSURE: 126 MMHG | HEART RATE: 82 BPM | DIASTOLIC BLOOD PRESSURE: 74 MMHG | RESPIRATION RATE: 16 BRPM | WEIGHT: 249 LBS | OXYGEN SATURATION: 97 %

## 2018-01-20 PROCEDURE — 99213 OFFICE O/P EST LOW 20 MIN: CPT

## 2018-01-26 ENCOUNTER — RX RENEWAL (OUTPATIENT)
Age: 63
End: 2018-01-26

## 2018-02-16 ENCOUNTER — TRANSCRIPTION ENCOUNTER (OUTPATIENT)
Age: 63
End: 2018-02-16

## 2018-02-17 ENCOUNTER — RX RENEWAL (OUTPATIENT)
Age: 63
End: 2018-02-17

## 2018-04-23 ENCOUNTER — RX RENEWAL (OUTPATIENT)
Age: 63
End: 2018-04-23

## 2018-04-26 RX ORDER — TADALAFIL 5 MG/1
5 TABLET, FILM COATED ORAL
Qty: 30 | Refills: 0 | Status: COMPLETED | COMMUNITY
Start: 2017-06-27 | End: 2018-04-26

## 2018-05-31 ENCOUNTER — RX RENEWAL (OUTPATIENT)
Age: 63
End: 2018-05-31

## 2018-11-02 ENCOUNTER — APPOINTMENT (OUTPATIENT)
Dept: FAMILY MEDICINE | Facility: CLINIC | Age: 63
End: 2018-11-02
Payer: COMMERCIAL

## 2018-11-02 VITALS
SYSTOLIC BLOOD PRESSURE: 144 MMHG | RESPIRATION RATE: 16 BRPM | WEIGHT: 246 LBS | DIASTOLIC BLOOD PRESSURE: 80 MMHG | BODY MASS INDEX: 32.46 KG/M2 | OXYGEN SATURATION: 95 % | TEMPERATURE: 99.5 F | HEART RATE: 80 BPM

## 2018-11-02 PROCEDURE — 99213 OFFICE O/P EST LOW 20 MIN: CPT

## 2018-11-02 RX ORDER — CLINDAMYCIN HYDROCHLORIDE 300 MG/1
300 CAPSULE ORAL
Qty: 42 | Refills: 0 | Status: COMPLETED | COMMUNITY
Start: 2018-01-20 | End: 2018-11-02

## 2018-11-02 RX ORDER — CEFUROXIME AXETIL 500 MG/1
500 TABLET ORAL
Qty: 20 | Refills: 0 | Status: COMPLETED | COMMUNITY
Start: 2018-02-17 | End: 2018-11-02

## 2018-11-02 RX ORDER — FLUTICASONE PROPIONATE 50 UG/1
50 SPRAY, METERED NASAL DAILY
Qty: 1 | Refills: 6 | Status: COMPLETED | COMMUNITY
Start: 2017-11-08 | End: 2018-11-02

## 2018-11-02 RX ORDER — CLARITHROMYCIN 500 MG/1
500 TABLET, FILM COATED ORAL
Qty: 20 | Refills: 0 | Status: COMPLETED | COMMUNITY
Start: 2017-11-08 | End: 2018-11-02

## 2018-11-02 RX ORDER — PREDNISONE 20 MG/1
20 TABLET ORAL
Qty: 16 | Refills: 0 | Status: COMPLETED | COMMUNITY
Start: 2018-01-09 | End: 2018-11-02

## 2018-11-02 RX ORDER — HYDROCODONE POLISTIREX AND CHLORPHENIRAMINE POLISTIREX 10; 8 MG/5ML; MG/5ML
10-8 SUSPENSION, EXTENDED RELEASE ORAL
Qty: 100 | Refills: 0 | Status: COMPLETED | COMMUNITY
Start: 2018-01-26 | End: 2018-11-02

## 2018-11-02 RX ORDER — AMOXICILLIN AND CLAVULANATE POTASSIUM 875; 125 MG/1; MG/1
875-125 TABLET, COATED ORAL
Qty: 20 | Refills: 0 | Status: COMPLETED | COMMUNITY
Start: 2018-01-09 | End: 2018-11-02

## 2018-11-02 NOTE — HISTORY OF PRESENT ILLNESS
[FreeTextEntry8] : Pt is here c/o diarrhea, nausea, body aches, feeling tired, sinus pressure, head congestion, sweats x 2 days. States his granddaughters were sick with viruses. Felt feverish at home, chills this morning.

## 2018-11-02 NOTE — ASSESSMENT
[FreeTextEntry1] : Likely viral prodrome : Rest, fluids, flonase, xyzal, vitamin C, salt water gargles, tea with honey.\par     - patient instructed to RTO if symptoms worsen or persist, if fevers >101 develop, does not get better in 7 days.\par     - Watchful waiting - Patient will wait at least 3 days before calling for antibiotic, will try conservative/supportive treatment first. educated on antibiotic resistance.\par     - bland/BRAT diet encouraged, immodium prn diarrhea.

## 2018-11-02 NOTE — PHYSICAL EXAM
[No Acute Distress] : no acute distress [Well Nourished] : well nourished [Well Developed] : well developed [Well-Appearing] : well-appearing [Normal Sclera/Conjunctiva] : normal sclera/conjunctiva [Normal Outer Ear/Nose] : the outer ears and nose were normal in appearance [Supple] : supple [No Lymphadenopathy] : no lymphadenopathy [No Respiratory Distress] : no respiratory distress  [Clear to Auscultation] : lungs were clear to auscultation bilaterally [No Accessory Muscle Use] : no accessory muscle use [Normal Rate] : normal rate  [Regular Rhythm] : with a regular rhythm [Normal S1, S2] : normal S1 and S2 [No Murmur] : no murmur heard [Normal Gait] : normal gait [Coordination Grossly Intact] : coordination grossly intact [No Focal Deficits] : no focal deficits [Normal Affect] : the affect was normal [Alert and Oriented x3] : oriented to person, place, and time [Normal Insight/Judgement] : insight and judgment were intact [de-identified] : bilateral nasal turbinates and posterior oropharynx erythematous, moderate clear post nasal drip

## 2019-03-13 NOTE — ED ADULT NURSE NOTE - TEMPLATE
[facility]:  Care Coordination Note     SITUATION   Patient is a 55 year old who is receiving support for:  Clinic Care Coordination - Initial (New pt requesting zero depth vaginoplasty)  .    BACKGROUND     New patient requesting zero-depth vaginoplasty, pt already had orchiectomy and lap band surgery and several other surgeries.     Pt reports she is intersexed, she has a uterus and a scan that proves this.     ASSESSMENT     Surgery              [facility] Assessment  [facility] ([facility]) Enrollment: Enrolled  Patient has a therapist: No(Former therapist will write letter of support)  Letter of support #2: (Former psychiatrist will write second letter)  Surgery being considered: Yes  Vaginoplasty: Yes(Zero depth)  Orchiectomy: Yes  Orchiectomy completed: Yes  Orchiectomy Date: 08/01/17    PCP Dr. Sanjana Gaming with [facility] in [facility]. Phone: [phone]      PLAN       Nursing Interventions:   [facility] program and services discussed with patient. [facility] referral placed. [facility] assessment completed. Process for accessing surgery discussed including: [facility] standards of care, Letters of support, [place] insurance process, surgery scheduling, and approximate timeline. Pt questions answered. Registration completed & reviewed; scheduling process discussed & completed, as necessary.       Follow-up plan:  Pt to obtain two letters of support from mental health providers and fax to Dr. Armando Pang's office. Fax # was provided. Pt to sign up for [facility]. Once two letters have been received, pt will be called and scheduled for lower surgery consult.        Devan Burciaga  
Neuro

## 2019-04-17 ENCOUNTER — APPOINTMENT (OUTPATIENT)
Dept: FAMILY MEDICINE | Facility: CLINIC | Age: 64
End: 2019-04-17
Payer: COMMERCIAL

## 2019-04-17 ENCOUNTER — NON-APPOINTMENT (OUTPATIENT)
Age: 64
End: 2019-04-17

## 2019-04-17 VITALS
WEIGHT: 252 LBS | BODY MASS INDEX: 33.4 KG/M2 | DIASTOLIC BLOOD PRESSURE: 88 MMHG | HEIGHT: 73 IN | SYSTOLIC BLOOD PRESSURE: 126 MMHG | OXYGEN SATURATION: 97 % | HEART RATE: 74 BPM | RESPIRATION RATE: 16 BRPM

## 2019-04-17 DIAGNOSIS — Z12.11 ENCOUNTER FOR SCREENING FOR MALIGNANT NEOPLASM OF COLON: ICD-10-CM

## 2019-04-17 LAB
BASOPHILS # BLD AUTO: 0.09 K/UL
BASOPHILS NFR BLD AUTO: 1.2 %
BILIRUB UR QL STRIP: NORMAL
EOSINOPHIL # BLD AUTO: 0.26 K/UL
EOSINOPHIL NFR BLD AUTO: 3.5 %
GLUCOSE UR-MCNC: NORMAL
HCG UR QL: 0.2 EU/DL
HCT VFR BLD CALC: 49.9 %
HGB BLD-MCNC: 17.6 G/DL
HGB UR QL STRIP.AUTO: NORMAL
IMM GRANULOCYTES NFR BLD AUTO: 0.3 %
KETONES UR-MCNC: NORMAL
LEUKOCYTE ESTERASE UR QL STRIP: NORMAL
LYMPHOCYTES # BLD AUTO: 2.28 K/UL
LYMPHOCYTES NFR BLD AUTO: 30.3 %
MAN DIFF?: NORMAL
MCHC RBC-ENTMCNC: 31.8 PG
MCHC RBC-ENTMCNC: 35.3 GM/DL
MCV RBC AUTO: 90.2 FL
MONOCYTES # BLD AUTO: 0.65 K/UL
MONOCYTES NFR BLD AUTO: 8.6 %
NEUTROPHILS # BLD AUTO: 4.23 K/UL
NEUTROPHILS NFR BLD AUTO: 56.1 %
NITRITE UR QL STRIP: NORMAL
PH UR STRIP: 6
PLATELET # BLD AUTO: 170 K/UL
PROT UR STRIP-MCNC: NORMAL
RBC # BLD: 5.53 M/UL
RBC # FLD: 13.3 %
SP GR UR STRIP: 1.02
WBC # FLD AUTO: 7.53 K/UL

## 2019-04-17 PROCEDURE — 36415 COLL VENOUS BLD VENIPUNCTURE: CPT

## 2019-04-17 PROCEDURE — 99396 PREV VISIT EST AGE 40-64: CPT | Mod: 25

## 2019-04-17 PROCEDURE — 93000 ELECTROCARDIOGRAM COMPLETE: CPT

## 2019-04-17 PROCEDURE — 81003 URINALYSIS AUTO W/O SCOPE: CPT | Mod: QW

## 2019-04-17 NOTE — PHYSICAL EXAM
[No Acute Distress] : no acute distress [Well Nourished] : well nourished [Well Developed] : well developed [Normal Sclera/Conjunctiva] : normal sclera/conjunctiva [Well-Appearing] : well-appearing [PERRL] : pupils equal round and reactive to light [Normal Outer Ear/Nose] : the outer ears and nose were normal in appearance [EOMI] : extraocular movements intact [No JVD] : no jugular venous distention [Normal Oropharynx] : the oropharynx was normal [Supple] : supple [No Lymphadenopathy] : no lymphadenopathy [Thyroid Normal, No Nodules] : the thyroid was normal and there were no nodules present [No Respiratory Distress] : no respiratory distress  [Clear to Auscultation] : lungs were clear to auscultation bilaterally [Regular Rhythm] : with a regular rhythm [Normal Rate] : normal rate  [No Murmur] : no murmur heard [Normal S1, S2] : normal S1 and S2 [No Carotid Bruits] : no carotid bruits [No Varicosities] : no varicosities [No Abdominal Bruit] : a ~M bruit was not heard ~T in the abdomen [Pedal Pulses Present] : the pedal pulses are present [No Edema] : there was no peripheral edema [No Palpable Aorta] : no palpable aorta [Soft] : abdomen soft [No Extremity Clubbing/Cyanosis] : no extremity clubbing/cyanosis [Non Tender] : non-tender [Non-distended] : non-distended [No Masses] : no abdominal mass palpated [No HSM] : no HSM [Normal Bowel Sounds] : normal bowel sounds [Stool Occult Blood] : stool positive for occult blood [Normal Posterior Cervical Nodes] : no posterior cervical lymphadenopathy [No CVA Tenderness] : no CVA  tenderness [Normal Anterior Cervical Nodes] : no anterior cervical lymphadenopathy [No Joint Swelling] : no joint swelling [No Spinal Tenderness] : no spinal tenderness [Normal Gait] : normal gait [No Rash] : no rash [Grossly Normal Strength/Tone] : grossly normal strength/tone [Coordination Grossly Intact] : coordination grossly intact [No Focal Deficits] : no focal deficits [Deep Tendon Reflexes (DTR)] : deep tendon reflexes were 2+ and symmetric [Normal Affect] : the affect was normal [Memory Grossly Normal] : memory grossly normal [Speech Grossly Normal] : speech grossly normal [Alert and Oriented x3] : oriented to person, place, and time [Normal Mood] : the mood was normal [FreeTextEntry1] : seen by urology [Normal Insight/Judgement] : insight and judgment were intact [de-identified] : seen by urilogy

## 2019-04-17 NOTE — ASSESSMENT
[FreeTextEntry1] : UA/EKG reviewed with pt.  Episodic palpitations - he will follow up with his cardiologist- needs an event recorder done.  Diet/exercise reviewed.  FOB kit given.  He will be calling GI to do follow up colonoscopy.  Lab done here today.  MRI needed to follow up on his shunt/hydrocephalus-  ordered without  contrast.

## 2019-04-17 NOTE — HEALTH RISK ASSESSMENT
[Good] : ~his/her~  mood as  good [No falls in past year] : Patient reported no falls in the past year [0] : 2) Feeling down, depressed, or hopeless: Not at all (0) [Hepatitis C test declined] : Hepatitis C test declined [HIV test declined] : HIV test declined [None] : None [Retired] : retired [With Significant Other] : lives with significant other [On disability] : on disability [] :  [Graduate School] : graduate school [# Of Children ___] : has [unfilled] children [Sexually Active] : sexually active [Fully functional (bathing, dressing, toileting, transferring, walking, feeding)] : Fully functional (bathing, dressing, toileting, transferring, walking, feeding) [Feels Safe at Home] : Feels safe at home [Fully functional (using the telephone, shopping, preparing meals, housekeeping, doing laundry, using] : Fully functional and needs no help or supervision to perform IADLs (using the telephone, shopping, preparing meals, housekeeping, doing laundry, using transportation, managing medications and managing finances) [Reports changes in hearing] : Reports changes in hearing [Reports normal functional visual acuity (ie: able to read med bottle)] : Reports normal functional visual acuity [Smoke Detector] : smoke detector [Carbon Monoxide Detector] : carbon monoxide detector [Seat Belt] :  uses seat belt [Safety elements used in home] : safety elements used in home [Sunscreen] : uses sunscreen [Patient/Caregiver unclear of wishes] : Patient/Caregiver unclear of wishes [] : No [de-identified] : social [de-identified] : aerobic exercise [[id number]] : 0 [de-identified] : lower carbs [Change in mental status noted] : No change in mental status noted [Language] : denies difficulty with language [Behavior] : denies difficulty with behavior [Learning/Retaining New Information] : denies difficulty learning/retaining new information [Handling Complex Tasks] : denies difficulty handling complex tasks [Reasoning] : denies difficulty with reasoning [Spatial Ability and Orientation] : denies difficulty with spatial ability and orientation [Reports changes in vision] : Reports no changes in vision [Reports changes in dental health] : Reports no changes in dental health [Guns at Home] : no guns at home [Travel to Developing Areas] : does not  travel to developing areas [TB Exposure] : is not being exposed to tuberculosis [ColonoscopyDate] : 2013 [AdvancecareDate] : 4/17/2019

## 2019-04-18 LAB
25(OH)D3 SERPL-MCNC: 23.2 NG/ML
ALBUMIN SERPL ELPH-MCNC: 4.7 G/DL
ALP BLD-CCNC: 74 U/L
ALT SERPL-CCNC: 17 U/L
ANION GAP SERPL CALC-SCNC: 13 MMOL/L
AST SERPL-CCNC: 16 U/L
BILIRUB SERPL-MCNC: 1.1 MG/DL
BUN SERPL-MCNC: 19 MG/DL
CALCIUM SERPL-MCNC: 9.5 MG/DL
CHLORIDE SERPL-SCNC: 105 MMOL/L
CHOLEST SERPL-MCNC: 152 MG/DL
CHOLEST/HDLC SERPL: 3.6 RATIO
CO2 SERPL-SCNC: 25 MMOL/L
CREAT SERPL-MCNC: 0.95 MG/DL
ESTIMATED AVERAGE GLUCOSE: 91 MG/DL
FOLATE SERPL-MCNC: 17.8 NG/ML
GLUCOSE SERPL-MCNC: 101 MG/DL
HBA1C MFR BLD HPLC: 4.8 %
HDLC SERPL-MCNC: 42 MG/DL
LDLC SERPL CALC-MCNC: 81 MG/DL
POTASSIUM SERPL-SCNC: 4.6 MMOL/L
PROT SERPL-MCNC: 6.6 G/DL
PSA SERPL-MCNC: 1.04 NG/ML
SODIUM SERPL-SCNC: 143 MMOL/L
T3FREE SERPL-MCNC: 3.38 PG/ML
T4 FREE SERPL-MCNC: 1.1 NG/DL
TRIGL SERPL-MCNC: 146 MG/DL
TSH SERPL-ACNC: 3.05 UIU/ML
VIT B12 SERPL-MCNC: 562 PG/ML

## 2019-04-22 LAB
TESTOST BND SERPL-MCNC: 10.5 PG/ML
TESTOST SERPL-MCNC: 303.3 NG/DL

## 2019-05-25 LAB — HEMOCCULT STL QL IA: NEGATIVE

## 2019-06-26 NOTE — PROGRESS NOTE ADULT - SUBJECTIVE AND OBJECTIVE BOX
checked. No Concerns. Last filled 5/28/19.    Patient seen and evaluated at bedside s/p placement of VPS. Postoperatively, patient is wide awake and neurologically intact w/ no complaints. Postop CTH demonstrates adequate drain placement w/ no acute hemorrhage or mass effect.    Exam:  T(C): 36.8 (08-21-17 @ 16:45), Max: 36.9 (08-20-17 @ 20:01)  HR: 58 (08-21-17 @ 17:00) (58 - 80)  BP: 141/65 (08-21-17 @ 17:00) (136/51 - 188/90)  RR: 16 (08-21-17 @ 17:00) (12 - 19)  SpO2: 100% (08-21-17 @ 17:00) (92% - 100%)  Wt(kg): --    AAOx3, EOS, FC  PERRL, EOMI  MEEHAN 5/5, no drift  SILT throughout                        16.3   9.1   )-----------( 183      ( 20 Aug 2017 21:14 )             45.1     08-20    138  |  100  |  19  ----------------------------<  97  4.0   |  25  |  0.83    Ca    9.0      20 Aug 2017 21:14    TPro  6.8  /  Alb  4.2  /  TBili  0.9  /  DBili  x   /  AST  18  /  ALT  24  /  AlkPhos  70  08-20  PT/INR - ( 20 Aug 2017 21:14 )   PT: 12.1 sec;   INR: 1.11 ratio         PTT - ( 20 Aug 2017 21:14 )  PTT:36.0 sec

## 2019-07-23 NOTE — ED PROVIDER NOTE - DISPOSITION TYPE
Writer left  for pt to call us back to confirm acupuncture appt that needed to be moved from 1250 to 150 pm Wednesday 7/24, 2019.  
ADMIT

## 2019-09-10 ENCOUNTER — APPOINTMENT (OUTPATIENT)
Dept: FAMILY MEDICINE | Facility: CLINIC | Age: 64
End: 2019-09-10
Payer: COMMERCIAL

## 2019-09-10 VITALS
WEIGHT: 248 LBS | SYSTOLIC BLOOD PRESSURE: 130 MMHG | BODY MASS INDEX: 32.72 KG/M2 | DIASTOLIC BLOOD PRESSURE: 80 MMHG | OXYGEN SATURATION: 98 % | HEART RATE: 94 BPM | RESPIRATION RATE: 16 BRPM

## 2019-09-10 DIAGNOSIS — H01.009 UNSPECIFIED BLEPHARITIS UNSPECIFIED EYE, UNSPECIFIED EYELID: ICD-10-CM

## 2019-09-10 PROCEDURE — 99213 OFFICE O/P EST LOW 20 MIN: CPT

## 2019-09-10 NOTE — PHYSICAL EXAM
[Normal] : affect was normal and insight and judgment were intact [de-identified] : R lower lid margin erythematous and swelling noted. small pustule noted near inner canthus.

## 2019-09-10 NOTE — HISTORY OF PRESENT ILLNESS
[FreeTextEntry8] : pt c/o right eye +red +hot +swelling x 1 day. Denies fevers, chills, vision changes.

## 2019-09-10 NOTE — ASSESSMENT
[FreeTextEntry1] : Start abx ointment, start warm compresses. \par RTO if symptoms worsen or persist.

## 2019-10-03 ENCOUNTER — APPOINTMENT (OUTPATIENT)
Dept: NEUROLOGY | Facility: CLINIC | Age: 64
End: 2019-10-03
Payer: COMMERCIAL

## 2019-10-03 VITALS
HEIGHT: 75 IN | WEIGHT: 248 LBS | DIASTOLIC BLOOD PRESSURE: 86 MMHG | BODY MASS INDEX: 30.84 KG/M2 | HEART RATE: 81 BPM | SYSTOLIC BLOOD PRESSURE: 183 MMHG

## 2019-10-03 DIAGNOSIS — Z98.2 PRESENCE OF CEREBROSPINAL FLUID DRAINAGE DEVICE: ICD-10-CM

## 2019-10-03 DIAGNOSIS — Z78.9 OTHER SPECIFIED HEALTH STATUS: ICD-10-CM

## 2019-10-03 DIAGNOSIS — R53.83 OTHER FATIGUE: ICD-10-CM

## 2019-10-03 PROCEDURE — 99215 OFFICE O/P EST HI 40 MIN: CPT

## 2019-10-03 NOTE — HISTORY OF PRESENT ILLNESS
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 \par He is very much shunt dependent and he requires regular shunt setting adjustments. \par The most recent revision was in August of 2017 done by Dr. 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 There was complete replacement of the entire system. \par He had an MRI brain in April of this year and it showed no significant change since 9/27/19. No hydro and and there was slight asymmetric dilatation of the body of the right lateral ventricle. No acute infarct or masses. \par Currently , he denies any headaches or balance difficulty or confusion or memory changes.

## 2019-10-03 NOTE — DISCUSSION/SUMMARY
[FreeTextEntry1] : 1. PAtient with  shunt dependent aqueductal stenosis and hydo: currently doing very well on shunt at 1.5 and MRI brain most recent was stable.\par will arrange follow up with Dr. Franco Cadena as well in [place]\par \par 2. Will continue to monitor very closely \par \par 3. Memory and cognitive issues: stable and will monitor closely and he did not have good response with aricept and will consider formal neurocognitive testing maybe next visit.\par \par 4. Follow up in 3 -4 months and all questions answered.

## 2019-10-03 NOTE — REVIEW OF SYSTEMS
[Fever] : no fever [Chills] : no chills [Feeling Tired] : feeling tired [Sleep Disturbances] : sleep disturbances [Anxiety] : no anxiety [Depression] : no depression [As Noted in HPI] : as noted in HPI [Hand Weakness] : no hand weakness [Leg Weakness] : no leg weakness [Dizziness] : no dizziness [Lightheadedness] : no lightheadedness [Migraine Headache] : no migraine headache [Tension Headache] : no tension-type headache [Difficulty Walking] : no difficulty walking [Eyesight Problems] : eyesight problems [Joint Pain] : joint pain [Joint Stiffness] : joint stiffness [Negative] : Heme/Lymph

## 2019-10-03 NOTE — PHYSICAL EXAM
[General Appearance - Alert] : alert [Oriented To Time, Place, And Person] : oriented to person, place, and time [Person] : oriented to person [Place] : oriented to place [Time] : oriented to time [Cranial Nerves Optic (II)] : visual acuity intact bilaterally,  visual fields full to confrontation, pupils equal round and reactive to light [Cranial Nerves Oculomotor (III)] : extraocular motion intact [Cranial Nerves Trigeminal (V)] : facial sensation intact symmetrically [Cranial Nerves Facial (VII)] : face symmetrical [Cranial Nerves Vestibulocochlear (VIII)] : hearing was intact bilaterally [Cranial Nerves Glossopharyngeal (IX)] : tongue and palate midline [Cranial Nerves Accessory (XI - Cranial And Spinal)] : head turning and shoulder shrug symmetric [Cranial Nerves Hypoglossal (XII)] : there was no tongue deviation with protrusion [Motor Tone] : muscle tone was normal in all four extremities [Motor Strength] : muscle strength was normal in all four extremities [Involuntary Movements] : no involuntary movements were seen [No Muscle Atrophy] : normal bulk in all four extremities [Motor Handedness Right-Handed] : the patient is right hand dominant [Paresis Pronator Drift Right-Sided] : no pronator drift on the right [Paresis Pronator Drift Left-Sided] : no pronator drift on the left [Limited Balance] : balance was intact [Past-pointing] : there was no past-pointing [Tremor] : no tremor present [3+] : Biceps left 3+ [2+] : Patella left 2+ [0] : Ankle jerk right 0 [1+] : Ankle jerk left 1+ [Plantar Reflex Right Only] : normal on the right [FreeTextEntry7] : decreased sensation to vibration and PP in left lower leg  [Plantar Reflex Left Only] : normal on the left [Extraocular Movements] : extraocular movements were intact [Neck Appearance] : the appearance of the neck was normal [Heart Sounds] : normal S1 and S2 [] : no rash [Skin Lesions] : no skin lesions

## 2019-10-25 ENCOUNTER — APPOINTMENT (OUTPATIENT)
Dept: FAMILY MEDICINE | Facility: CLINIC | Age: 64
End: 2019-10-25
Payer: COMMERCIAL

## 2019-10-25 VITALS
DIASTOLIC BLOOD PRESSURE: 90 MMHG | WEIGHT: 248 LBS | BODY MASS INDEX: 30.84 KG/M2 | HEART RATE: 84 BPM | OXYGEN SATURATION: 98 % | RESPIRATION RATE: 14 BRPM | SYSTOLIC BLOOD PRESSURE: 132 MMHG | HEIGHT: 75 IN

## 2019-10-25 DIAGNOSIS — I49.3 VENTRICULAR PREMATURE DEPOLARIZATION: ICD-10-CM

## 2019-10-25 PROCEDURE — 99213 OFFICE O/P EST LOW 20 MIN: CPT

## 2019-10-25 RX ORDER — METOPROLOL SUCCINATE 25 MG/1
25 TABLET, EXTENDED RELEASE ORAL
Qty: 30 | Refills: 0 | Status: ACTIVE | COMMUNITY
Start: 2019-10-17

## 2019-10-25 NOTE — ASSESSMENT
[FreeTextEntry1] : 1.  We reviewed his use of OTC medications- no decongestants,  water intake reviewed.  Rx for  cefdinir 300 mg 1 bid pc.   2.  We reviewed his scheduled cardiac ablation on Wednesday.

## 2019-10-25 NOTE — REVIEW OF SYSTEMS
[Chills] : chills [Postnasal Drip] : postnasal drip [Nasal Discharge] : nasal discharge [Negative] : Heme/Lymph [FreeTextEntry4] : facial tenderness

## 2019-10-25 NOTE — HISTORY OF PRESENT ILLNESS
[FreeTextEntry8] : pt c/o +ST, +Cough phlegm, -HA,- ear pain, +sinus pressure, +congestion, -temp,- SOB  X 1 day   He will need a cardiac ablation.  It is scheduled for Wednesday.  \par

## 2019-10-25 NOTE — PHYSICAL EXAM
[Ill-Appearing] : ill-appearing [Normal TMs] : both tympanic membranes were normal [Normal Outer Ear/Nose] : the outer ears and nose were normal in appearance [Normal Rate] : normal rate  [No Carotid Bruits] : no carotid bruits [No Edema] : there was no peripheral edema [Normal Posterior Cervical Nodes] : no posterior cervical lymphadenopathy [Normal Anterior Cervical Nodes] : no anterior cervical lymphadenopathy [Normal] : affect was normal and insight and judgment were intact [de-identified] : facial tenderness, purulent PND [de-identified] : has ectopy

## 2020-01-08 ENCOUNTER — APPOINTMENT (OUTPATIENT)
Dept: FAMILY MEDICINE | Facility: CLINIC | Age: 65
End: 2020-01-08
Payer: MEDICARE

## 2020-01-08 VITALS
WEIGHT: 258 LBS | BODY MASS INDEX: 32.08 KG/M2 | DIASTOLIC BLOOD PRESSURE: 86 MMHG | TEMPERATURE: 98.6 F | OXYGEN SATURATION: 97 % | HEART RATE: 79 BPM | HEIGHT: 75 IN | SYSTOLIC BLOOD PRESSURE: 130 MMHG

## 2020-01-08 DIAGNOSIS — J01.90 ACUTE SINUSITIS, UNSPECIFIED: ICD-10-CM

## 2020-01-08 DIAGNOSIS — Z87.898 PERSONAL HISTORY OF OTHER SPECIFIED CONDITIONS: ICD-10-CM

## 2020-01-08 DIAGNOSIS — Z87.09 PERSONAL HISTORY OF OTHER DISEASES OF THE RESPIRATORY SYSTEM: ICD-10-CM

## 2020-01-08 PROCEDURE — 87804 INFLUENZA ASSAY W/OPTIC: CPT | Mod: 59,QW

## 2020-01-08 PROCEDURE — 99213 OFFICE O/P EST LOW 20 MIN: CPT | Mod: 25

## 2020-01-08 RX ORDER — TADALAFIL 5 MG/1
5 TABLET, FILM COATED ORAL
Qty: 30 | Refills: 10 | Status: DISCONTINUED | COMMUNITY
Start: 2017-10-13 | End: 2020-01-08

## 2020-01-08 RX ORDER — CEFDINIR 300 MG/1
300 CAPSULE ORAL
Qty: 1 | Refills: 0 | Status: DISCONTINUED | COMMUNITY
Start: 2019-10-25 | End: 2020-01-08

## 2020-01-08 RX ORDER — BUDESONIDE AND FORMOTEROL FUMARATE DIHYDRATE 160; 4.5 UG/1; UG/1
160-4.5 AEROSOL RESPIRATORY (INHALATION)
Qty: 10.2 | Refills: 5 | Status: DISCONTINUED | COMMUNITY
Start: 2018-01-20 | End: 2020-01-08

## 2020-01-08 RX ORDER — ERYTHROMYCIN 5 MG/G
5 OINTMENT OPHTHALMIC
Qty: 1 | Refills: 0 | Status: DISCONTINUED | COMMUNITY
Start: 2019-09-10 | End: 2020-01-08

## 2020-01-08 RX ORDER — LEVOCETIRIZINE DIHYDROCHLORIDE 5 MG/1
5 TABLET ORAL DAILY
Qty: 30 | Refills: 0 | Status: DISCONTINUED | COMMUNITY
Start: 2018-11-02 | End: 2020-01-08

## 2020-01-08 NOTE — PLAN
[FreeTextEntry1] : Acute Sinusitis \par Flonase/ Azelastine\par Antibiotics as prescribed\par Probiotic po daily\par Increase Fluids\par Advised humidifier use\par rapid flu neg\par flu swab sent due to body aches\par \par f/u 3 days if no relief, pt agreed w/plan\par \par \par \par

## 2020-01-08 NOTE — PHYSICAL EXAM
[No Lymphadenopathy] : no lymphadenopathy [Supple] : supple [No Edema] : there was no peripheral edema [Normal] : normal rate, regular rhythm, normal S1 and S2 and no murmur heard [No Focal Deficits] : no focal deficits [Normal Mood] : the mood was normal [Normal Affect] : the affect was normal [Normal Insight/Judgement] : insight and judgment were intact [de-identified] : turbinate inflammation

## 2020-01-08 NOTE — REVIEW OF SYSTEMS
[Nasal Discharge] : nasal discharge [Muscle Pain] : muscle pain [Fever] : no fever [Chills] : no chills [Chest Pain] : no chest pain [Shortness Of Breath] : no shortness of breath

## 2020-01-08 NOTE — HISTORY OF PRESENT ILLNESS
[FreeTextEntry8] : pt present for possible sinus infection \par \par he used some left over cefdinir 300mg x3 days and he used it for 2 days and it did not resolve\par he has nasal congestion and discharge was brown now it is getting clearer\par +pressure in face and sinus regions\par denies fevers or chills\par he had body aches but it resolved\par he is also feeling tired\par denies sick contacts\par

## 2020-01-09 LAB
FLU A RESULT: NOT DETECTED
FLU B RESULT: NOT DETECTED
RSV RESULT: NOT DETECTED

## 2020-01-30 LAB
FLUAV SPEC QL CULT: NORMAL
FLUBV AG SPEC QL IA: NORMAL

## 2020-02-13 ENCOUNTER — RESULT CHARGE (OUTPATIENT)
Age: 65
End: 2020-02-13

## 2020-02-14 ENCOUNTER — APPOINTMENT (OUTPATIENT)
Dept: FAMILY MEDICINE | Facility: CLINIC | Age: 65
End: 2020-02-14
Payer: MEDICARE

## 2020-02-14 VITALS
TEMPERATURE: 98.9 F | RESPIRATION RATE: 14 BRPM | HEIGHT: 75 IN | BODY MASS INDEX: 32.08 KG/M2 | OXYGEN SATURATION: 97 % | SYSTOLIC BLOOD PRESSURE: 120 MMHG | HEART RATE: 70 BPM | WEIGHT: 258 LBS | DIASTOLIC BLOOD PRESSURE: 70 MMHG

## 2020-02-14 DIAGNOSIS — R68.89 OTHER GENERAL SYMPTOMS AND SIGNS: ICD-10-CM

## 2020-02-14 DIAGNOSIS — J98.8 OTHER SPECIFIED RESPIRATORY DISORDERS: ICD-10-CM

## 2020-02-14 DIAGNOSIS — R09.81 NASAL CONGESTION: ICD-10-CM

## 2020-02-14 DIAGNOSIS — R52 PAIN, UNSPECIFIED: ICD-10-CM

## 2020-02-14 PROCEDURE — 87804 INFLUENZA ASSAY W/OPTIC: CPT | Mod: QW

## 2020-02-14 PROCEDURE — 99213 OFFICE O/P EST LOW 20 MIN: CPT | Mod: 25

## 2020-02-14 RX ORDER — AMOXICILLIN AND CLAVULANATE POTASSIUM 875; 125 MG/1; MG/1
875-125 TABLET, COATED ORAL
Qty: 20 | Refills: 0 | Status: DISCONTINUED | COMMUNITY
Start: 2020-01-08 | End: 2020-02-14

## 2020-02-14 RX ORDER — APIXABAN 5 MG/1
5 TABLET, FILM COATED ORAL
Qty: 60 | Refills: 0 | Status: DISCONTINUED | COMMUNITY
Start: 2019-10-31

## 2020-02-14 RX ORDER — AZITHROMYCIN 250 MG/1
250 TABLET, FILM COATED ORAL
Qty: 1 | Refills: 0 | Status: DISCONTINUED | COMMUNITY
Start: 2020-02-14 | End: 2020-02-14

## 2020-02-14 NOTE — PHYSICAL EXAM
[No Lymphadenopathy] : no lymphadenopathy [Supple] : supple [No Edema] : there was no peripheral edema [Normal] : normal rate, regular rhythm, normal S1 and S2 and no murmur heard [Normal Affect] : the affect was normal [No Focal Deficits] : no focal deficits [Normal Mood] : the mood was normal [Normal Insight/Judgement] : insight and judgment were intact [de-identified] : coughing

## 2020-02-14 NOTE — PLAN
[FreeTextEntry1] : cough, feels hot, body aches, nasal congestion \par Flonase/ Azelastine\par Increase Fluids\par Advised humidifier use\par rapid flu neg\par flu swab sent \par tessalon perles prn\par albuterol prn\par tamiflu as high suspicion for flu\par \par f/u 3 days if no relief, pt agreed w/plan\par

## 2020-02-14 NOTE — REVIEW OF SYSTEMS
[Nasal Discharge] : nasal discharge [Muscle Pain] : muscle pain [Cough] : cough [Chills] : no chills [Fever] : no fever [Earache] : no earache [Shortness Of Breath] : no shortness of breath [Chest Pain] : no chest pain [FreeTextEntry2] : feels hot [FreeTextEntry6] : feels tight

## 2020-02-14 NOTE — HISTORY OF PRESENT ILLNESS
[FreeTextEntry8] : pt present for same symptoms of last visit \par \par he has been feeling hot today \par denies chills \par +body aches started last night \par +nasal congestion \par + clear discharge\par sore throat only in the morning\par denies chest pain or sob but he feels tight with breathing, cough w/ clear sputum in the morning\par he took dayquil and nyquil\par

## 2020-02-15 LAB
FLU A RESULT: NOT DETECTED
FLU B RESULT: NOT DETECTED
RSV RESULT: NOT DETECTED

## 2020-03-03 LAB
FLUAV SPEC QL CULT: NORMAL
FLUBV AG SPEC QL IA: NORMAL

## 2020-03-20 DIAGNOSIS — R09.82 POSTNASAL DRIP: ICD-10-CM

## 2020-06-17 NOTE — H&P ADULT - NSHPRISKHIVSCREEN_GEN_ALL_CORE
REASON FOR VISIT  Chief Complaint   Patient presents with   • Follow-up     Endometrial cancer         HISTORY OF PRESENT ILLNESS    Ms. Galilea Olivas is a 81 year old female here for recurrent metastatic endometrial carcinoma.  Patient currently receiving palliative chemotherapy.    Portions of this note are brought forward from Dr. Benton's note; reviewed and edited by me as appropriate.     Oncologic history:  1. The patient presented 7/2013 with abdominal cramping and uterine mass. She had a robotic-assisted modified radical hysterectomy with bilateral salpingo-oophorectomy, bilateral pelvic lymphadenectomy and aortic lymph node sampling on July 16, 2013. Pathology showed a Grade I adenocarcinoma of the endometrium invading to 0.9 cm or 1.9 cm total myometrial thickness. Pelvic LNs were negative. No lymphovascular invasion. No additional treatment was given.  2. She developed abdominal pain in the LLQ 3/2017. CT colonoscopy from March 28, 2017 showed a 3.8 x 3.1 cm mass in the sigmoid colon occluding 75% of the lumen. Biopsy 3/24/17 showed a well to moderately differentiated adenocarcinoma, most consistent with an endometrial primary.     3. PET/CT scan 3/31/17 showed FDG avid biopsy-proven sigmoid mass with FDG avid pericolonic fat stranding and FDG avid carol ann metastasis.   4. Patient underwent laparoscopy assisted anterior proctosigmoidectomy with end to end coloproctostomy on 04/03/2017. Pathology showed well-differentiated adenocarcinoma consistent with endometrial primary 3.2 cm with involvement of full-thickness of the colon. There was metastatic adenocarcinoma in 2 out of 22 lymph nodes.   5. Adjuvant chemotherapy and radiation was recommended. She received 2 cycles of carboplatin and taxol chemotherapy. Her clinical course was complicated by adult failure to thrive, deconditioning, cancer cachexia/anorexia, shortness of breath, dyspnea on exertion, severe nausea and vomiting, grade 3 fatigue, fever,  and line associated DVT. She then proceeded to pelvic radiation (8/6/17 to 9/7/17).  6. She then went back to chemotherapy, receiving 4 additional cycles from 10/3/17 to 12/22/17.  7. She underwent takedown of loop ileostomy on 2/12/2018.  8. CT chest, abdomen and pelvis 4/17/18 was negative for cancer relapse.  9. CT chest, abdomen and pelvis 1/8/19 showed new solitary adenopathy inferior to the bifurcation of the aorta. PET/CT 1/19/19 showed marked increased uptake in this LN as well as possible soft tissue nodule near the left kidney.  10. On 2/7/19, she underwent laparoscopic periaortic LN biopsy, and laparoscopic excision of right lobe (liver) nodule. At the end of surgery, there was no residual gross disease. Pathology showed moderately differentiated metastatic endometrial cancer in the periaortic tissues and liver nodule. Cytology of the peritoneal fluid was positive for adenocarcinoma.  11. Additional testing was performed on her tumor tissue. Hormone receptor studies from 2/7/19: Estrogen Receptor POSITIVE; 98% of Progesterone Receptor POSITIVE. Her tumor was MSI-high as well.  12. On 2/27/19 she started a trial of letrozole (hormonal therapy).  13. CT chest/abd/pelvis 7/10/19 showed increased adenopathy near the aortic bifurcation.  14. Restaging PET scan 7/20/19 showed metastases in multiple omental/mesenteric sites, capsular implants along the liver and spleen, abdominopelvic LNs and small volume pelvic ascites.  14. The letrozole was discontinued. She started a trial of pembrolizumab 8/13/19. She received 4 rounds, last 10/22/19.  15. CT scan of chest abdomen pelvis done on 5th November 2019 and reviewed by Dr. Caal shows progression of disease specially in the peritoneal nodularity and serosal implants along the liver and spleen.  Increased ascites.  16. CT scan of chest abdomen pelvis done in February 2020 shows evidence of progressive disease in liver and also significant worsening of peritoneal  nodularity implants and also splenic metastases.  Will recommend that patient should stop taking her pembrolizumab.   17. If approved by insurance my recommendation would be to start patient on carboplatin and paclitaxel.    18. After 3 cycles of carboplatin and paclitaxel patient had significant improvement in  which has normalized to 32 and CT scan showed significant improvement in mesenteric masses and liver metastases consistent with very good partial remission.  19. After 5 cycles of carboplatin and paclitaxel patient had significant improvement in  which has normalized to  and CT scan showed significant improvement in mesenteric masses and liver metastases consistent with very good partial remission.        INTERVAL HISTORY:    Initially on starting chemotherapy she had significant abdominal pain and discomfort and nausea.  However these problem seems to be getting much better now.  According to her her appetite is improving.  Her nausea has pretty much resolved.  She denies any constipation.  Denies any fever.  She still continues to complain of fatigue and and also had some shortness of breath on exertion for which she underwent imaging studies last and also was tested for COVID which came back as negative.  Her diarrhea is improved as compared to before.  She does continue to complain of some shortness of breath especially on exertion.    The patient is currently receiving chemotherapy for recurrent metastatic endometrial carcinoma.  She denies new cough, or dysphagia. She denies any headaches or bone pains. No fever, night sweats, anorexia or weight loss. No evidence of bleeding .    Allergies as of 06/17/2020 - Reviewed 06/17/2020   Allergen Reaction Noted   • Metoprolol SHORTNESS OF BREATH 07/16/2013   • Benztropine Hallucinations 01/24/2020   • Cogentin [benztropine mesylate]  07/09/2013        Current Outpatient Medications   Medication   • dexamethasone (DECADRON) 4 MG tablet   •  calcium-vitamin D (OSCAL-500) 500-200 MG-UNIT per tablet   • loperamide (IMODIUM) 2 MG capsule   • magnesium oxide (MAG-OX) 400 MG tablet   • losartan (COZAAR) 100 MG tablet   • aspirin 81 MG EC tablet   • amLODIPine (NORVASC) 2.5 MG tablet   • rosuvastatin (CRESTOR) 20 MG tablet   • cyanocobalamin 1000 MCG tablet   • lactobacillus acidophilus (BACID)   • Multiple Vitamins-Minerals (OCUVITE ADULT 50+) CAPS   • iodine wound-clean dressing (IODOSORB) 0.9 % gel   • prochlorperazine (COMPAZINE) 10 MG tablet   • Polyethylene Glycol 3350 (MIRALAX PO)   • SENNA PO   • ondansetron (ZOFRAN ODT) 8 MG disintegrating tablet   • traMADol (ULTRAM) 50 MG tablet     No current facility-administered medications for this visit.      Facility-Administered Medications Ordered in Other Visits   Medication   • heparin 100 UNIT/ML lock flush 500 Units   • heparin flush 100 UNIT/ML lock flush 500 Units        Past Medical History:   Diagnosis Date   • ARMD (age related macular degeneration) 2/20/2014   • Colonic mass 3/23/2017   • Dengue fever    • Diverticulitis    • Endometrial adenocarcinoma (CMS/HCC)     stage IA, grade 1   • Glaucoma    • HTN (hypertension)    • Hypercholesteremia    • Hyperglycemia     pre diabetic   • Irregular heart beat     PVC's   • Osteoporosis, unspecified 08/20/2010    -2.5 dexa 11/2013   • Other after-cataract, not obscuring vision 2/20/2014       Social History     Socioeconomic History   • Marital status: /Civil Union     Spouse name: Not on file   • Number of children: Not on file   • Years of education: Not on file   • Highest education level: Not on file   Occupational History   • Occupation: retired    Social Needs   • Financial resource strain: Not on file   • Food insecurity:     Worry: Not on file     Inability: Not on file   • Transportation needs:     Medical: Not on file     Non-medical: Not on file   Tobacco Use   • Smoking status: Never Smoker   • Smokeless tobacco: Never Used   Substance  and Sexual Activity   • Alcohol use: Yes     Alcohol/week: 1.0 standard drinks     Types: 1 Standard drinks or equivalent per week     Frequency: Never     Drinks per session: 1 or 2     Binge frequency: Never     Comment: twice a year   • Drug use: No   • Sexual activity: Not on file   Lifestyle   • Physical activity:     Days per week: Not on file     Minutes per session: Not on file   • Stress: Not on file   Relationships   • Social connections:     Talks on phone: Not on file     Gets together: Not on file     Attends Zoroastrian service: Not on file     Active member of club or organization: Not on file     Attends meetings of clubs or organizations: Not on file     Relationship status: Not on file   • Intimate partner violence:     Fear of current or ex partner: Not on file     Emotionally abused: Not on file     Physically abused: Not on file     Forced sexual activity: Not on file   Other Topics Concern   • Not on file   Social History Narrative    Patient was born and raised in [place], [place], and attended [employer].  She met her future , Clay, through both families participating in any sports car club.  Clay also grew up in [place].  They were  at age 18, and will be celebrating their 62nd wedding anniversary next week.  After Clay completed training for work the family moved to [place] for his job.  Patient worked as a nurse in Obstetrics, and she expresses great pride in having been a \"rebel rouser\" by teaching lamaze classes and advocating for fathers to be allowed in the delivery room when this was not the norm.  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  Patient and her  had 5 children including daughter, Kellie (who is a retired dentist in [place]), daughter, Syl (who recently moved to [facility]), son, Getachew (who  in a motor vehicle accident in ), son, Lucien (who lives near [place]), and son, Yoan (who lives in [place], [place]).   They have 7 grandchildren ranging from ages 27 down to almost 3. 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       Family History   Problem Relation Age of Onset   • Stomach Cancer Mother 78   • Cancer, Colon Mother 48   • Cancer Mother         colon, stomach   • Cancer, Lung Father    • Cancer Father         lung   • Cancer, Endometrial Maternal Aunt 55       REVIEW OF SYSTEMS  Marilee Martinjohn  6/17/2020 12:40 PM  Sign when Signing Visit  Galilea Olivas is a 81 year old female here for  Chief Complaint   Patient presents with   • Follow-up     Endometrial cancer      Denies latex allergy or sensitivity.    Medication verified and med list updated.  PCP and Pharmacy verified.    Social History     Tobacco Use   Smoking Status Never Smoker   Smokeless Tobacco Never Used     Advance Directives Filed: Yes    ECOG:   ECOG [06/17/20 1233]   ECOG Performance Status 1       Height: Yes, shoes off.  Ht Readings from Last 1 Encounters:   06/17/20 4' 11\" (1.499 m)     Weight:Yes, shoes off.  Wt Readings from Last 3 Encounters:   06/17/20 52.6 kg   06/10/20 51.1 kg   06/03/20 50 kg       BMI: Body mass index is 23.43 kg/m².    REVIEW OF SYSTEMS  GENERAL:  Patient denies headache, fevers, chills, night sweats, change in appetite, weight loss, dizziness, but complains of: excessive fatigue  ALLERGIC/IMMUNOLOGIC: Verified allergies: Yes  EYES:  Patient denies significant visual difficulties, double vision, blurred vision  ENT/MOUTH: Patient denies problems with hearing, sore throat, sinus drainage, mouth sores  ENDOCRINE:  Patient denies diabetes, thyroid disease, hormone replacement, hot flashes  HEMATOLOGIC/LYMPHATIC: Patient denies easy bruising, bleeding, tender lymph nodes, swollen lymph nodes  BREASTS: Patient denies abnormal masses of breast, nipple discharge, pain  RESPIRATORY:  Patient denies lung pain with breathing, cough, coughing up blood, but complains of:  shortness of breath  CARDIOVASCULAR:  Patient denies anginal chest pain, palpitations, shortness of breath when lying flat, peripheral edema  GASTROINTESTINAL: Patient denies abdominal pain , nausea, vomiting, GI bleeding, constipation, change in bowel habits, heartburn, sensation of feeling full, difficulty swallowing, but complains of: diarrhea  : Patient denies abnormal genital masses, blood in the urine, frequency, urgency, burning with urination, hesitancy, incontinence, vaginal bleeding, discharge  MUSCULOSKELETAL:  Patient denies joint pain, bone pain, joint swelling, redness, decreased range of motion  SKIN:  Patient denies chronic rashes, inflammation, ulcerations, skin changes, itching  NEUROLOGIC:  Patient denies loss of balance, areas of focal weakness, abnormal gait, sensory problems, numbness, tingling  PSYCHIATRIC: Patient denies insomnia, depression, anxiety           PHYSICAL EXAMINATION    Performance status:    ECOG [06/17/20 1233]   ECOG Performance Status 1          General:  Elderly female appears to be in no acute distress, presents to the clinic ambulatory and alone .   Vitals Signs:   Visit Vitals  /56 (BP Location: LUE - Left upper extremity, Patient Position: Sitting, Cuff Size: Regular)   Pulse 67   Temp 99.3 °F (37.4 °C) (Temporal)   Resp 16   Ht 4' 11\" (1.499 m)   Wt 52.6 kg   SpO2 98%   BMI 23.43 kg/m²       Skin:  No area of ecchymosis, petechiae or rash.  Neurological:  Alert, oriented x3.    Chest Wall:  Mediport placed in right chest wall with no surrounding erythema or swelling.    LABS  Office Visit on 06/17/2020   Component Date Value Ref Range Status   • VIA MED ONC PATHWAYS TAG 06/17/2020 [id number]   Final   Lab Services on 06/17/2020   Component Date Value Ref Range Status   • Magnesium 06/17/2020 1.7  1.7 - 2.4 mg/dL Final   • Sodium 06/17/2020 138  135 - 145 mmol/L Final   • Potassium 06/17/2020 4.4  3.4 - 5.1 mmol/L Final   • Chloride 06/17/2020 105  98 - 107 mmol/L  Final   • Carbon Dioxide 06/17/2020 25  21 - 32 mmol/L Final   • Anion Gap 06/17/2020 12  10 - 20 mmol/L Final   • Glucose 06/17/2020 180* 65 - 99 mg/dL Final   • BUN 06/17/2020 18  6 - 20 mg/dL Final   • Creatinine 06/17/2020 1.05* 0.51 - 0.95 mg/dL Final   • Glomerular Filtration Rate 06/17/2020 50* >90 Final   • BUN/ Creatinine Ratio 06/17/2020 17  7 - 25 Final   • Bilirubin, Total 06/17/2020 0.4  0.2 - 1.0 mg/dL Final   • GOT/AST 06/17/2020 16  <=37 Units/L Final   • Alkaline Phosphatase 06/17/2020 70  45 - 117 Units/L Final   • Albumin 06/17/2020 2.9* 3.6 - 5.1 g/dL Final   • Protein, Total 06/17/2020 5.9* 6.4 - 8.2 g/dL Final   • Globulin 06/17/2020 3.0  2.0 - 4.0 g/dL Final   • A/G Ratio 06/17/2020 1.0  1.0 - 2.4 Final   • GPT/ALT 06/17/2020 31  <64 Units/L Final   • Calcium 06/17/2020 8.6  8.4 - 10.2 mg/dL Final   Lab Services on 06/15/2020   Component Date Value Ref Range Status   • SARS-CoV-2 by PCR 06/15/2020 Not Detected  Not Detected Final       DIAGNOSTICS:  Cta Chest And Ct Abd Pel W Contrast    Result Date: 6/10/2020  CTA CHEST W CONTRAST AND CT ABD PEL W CONTRAST CLINICAL HISTORY: Patient with endometrial cancer and shortness of breath. On chemotherapy. TECHNIQUE: Multidetector helical scanning of the chest abdomen pelvis after intravenous administration 100 mL Isovue-370.  25 mL's Omnipaque 350 fluid in 32 ounces of water used for oral contrast material.  MIP sequences in the axial and coronal planes. COMPARISON: CT chest abdomen pelvis 5/4/2020 FINDINGS: CHEST: Right anterior chest subcutaneous central venous port.  Tip at the cavoatrial junction.  No axillary, mediastinal, or hilar adenopathy. Normal opacification of the pulmonary arterial tree.  No evidence of pulmonary embolism.  Stable fusiform dilatation of the ascending thoracic aorta to 3.9 cm, 3/66. Linear opacities in the bibasilar lateral lung bases compatible with platelike atelectasis and/or scar.  No significant change.  No focal  Offered and patient declined infiltrates, lung mass, or suspicious pulmonary nodules. Thoracic kyphosis.  Mild multilevel degenerative disc changes.  No destructive osseous change. ABDOMEN/PELVIS: Several low-density masses in the right and left liver lobes again demonstrated without significant change.  Subcapsular 11 mm low-density lesion, 5/19, previously 13 mm.  Posterior superior right liver lobe hypodense lesion 12 mm size, 5/24, previously 14 mm.  Bilobed cyst in the anterior superior lateral segment left liver lobe, 2.0 cm 5/28, previously 2.0 cm.  Medial inferior lateral segment right liver lobe cyst, 2.5 cm size 5/38, previously 2.6 cm size.  No new liver lesions. Capsular implant right lateral liver lobe 1.5 x 0.4 cm 5/39, previously 14/5.  No new capsular implants. Stable cholecystectomy clips.  Normal opacification of the hepatic veins and portal veins and splenic vein.  Lateral splenic mass 2.0 cm AP, 5/36 previously 2.2 cm.  No significant change.  Other stable small low-density splenic masses.  No new splenic masses. Pancreas, and adrenal glands are normal.  Kidneys enhance symmetrically without hydronephrosis.  No renal masses.  No renal calcifications identified.  Ureters are normal in course and contour.  Bladder is underfilled.  Diffuse thickening of the bladder wall continued by underfilling.  Status post hysterectomy.  Air in the vagina.  No adnexal masses. Stable 7 mm aortocaval lymph node 5/70.  Stable soft tissue mass at the aortic bifurcation, 13 mm AP.  No pelvic or inguinal adenopathy.  No significant change in anterior midline lower abdominal omental implants, 11 x 6 mm series 5 image 92, previously 11 x 7 mm.  6 mm left anterior lower abdominal omental implant 5/93, unchanged.  Left paracolic implant abutting the left perirenal fascia and descending colon, 1.9 x 0.9 cm series 5 image 51, previously 2.4 x 1.4 cm. Stable small sliding hiatal hernia.  Stomach, small bowel, postsurgical changes from distal ileum,  colon, and postoperative changes from sigmoid colon resection, unchanged.  Stable, ununited right inferior pubic ramus fracture.  5/126.  Stable unhealed anterior acetabular fracture, 5/110     IMPRESSION: 1.  Stable CT scan chest.  No evidence of metastatic disease. 2.  Stable 3.9 cm fusiform dilatation ascending thoracic aorta. 3.  Stable low-density masses and cysts in the liver.  Stable capsular implants along the right lateral liver lobe.  No new or enlarging metastatic liver lesions. 4.  Stable low-density masses in the spleen. 5.  Decreased size of the left paracolic gutter abutting left perirenal fascia implant, 1.9 x 0.9, previously 2.4 x 1.4. 6.  Several other stable omental implants. 7.  No evidence of recurrence in the abdomen or pelvis. 8.  Stable healing fractures of the right anterior acetabulum and right inferior pubic ramus.    Ct Chest Abdomen Pelvis W Contrast    Result Date: 5/4/2020  CT CHEST ABDOMEN PELVIS W CONTRAST CLINICAL: 81-year-old female with metastatic recurrent endometrial cancer on palliative chemotherapy. COMPARISON: CT of the abdomen and pelvis dated February 24, 2020; CT of the chest dated July 10, 2019 PROTOCOL: Following the administration of oral contrast and the uneventful intravenous administration of 100 cc of Isovue 300, helical imaging of the chest, abdomen, and pelvis was performed.  CHEST FINDINGS: A right chest port is present. There is stable pleural-parenchymal scarring in the posterior left costophrenic sulcus. There is minimal scarring in the right lateral costophrenic sulcus. The lungs are otherwise clear. Central bronchi are normally patent and normal in caliber. There is no lymphadenopathy in the chest. The heart is normal in size. The ascending thoracic aorta is upper limits of normal in caliber at 39 mm. There is no pericardial or pleural effusion. There are no osseous destructive lesions.     CHEST IMPRESSION: No evidence of neoplastic process in the chest.  ABDOMEN/PELVIS FINDINGS:  Previously demonstrated hepatic hypodensities are smaller and less extensive. For instance, the previously demonstrated 19 mm lesion in hepatic segment 8 now measures 13 mm (4:9). A previously demonstrated 23 mm lesion in hepatic segment 7 now measures 14 mm (4:15). A previously demonstrated 29 mm lesion in hepatic segment one now measures 23 mm (4:26). Capsular lesions are also much smaller, with one overlying the hepatic segment 8 now measuring 14 x 5 mm (4:24), previously 25 x 14 mm. There are no new hepatic lesions. The gallbladder is surgically absent. The pancreas and pancreatic duct appear normal. Splenic masses are similarly smaller, the largest having diminished from 42 to 22 mm (4:31). The bilateral adrenal glands and kidneys appear normal. An aortic bifurcation lymph node has diminished from 2 cm to 7 mm in short axis (4:61). Previously demonstrated omental implants are much smaller. For example, a previously demonstrated omental implant just to the right of midline measures 7 x 11 mm (4:80), previously 23 x 40 mm. Bowel implants are no longer visible. The uterus is surgically absent. The urinary bladder is unremarkable. Chronic nonunited right pubic ramus fractures are redemonstrated. Sacral insufficiency fractures are also redemonstrated. Lower lumbar spondylosis is present. Lumbar vertebral height is maintained. ABDOMEN/PELVIS IMPRESSION: Substantial interval improvement in hepatic, splenic, and mesenteric/omental disease. No new disease is evident.       ASSESSMENT  1. Endometrial cancer, FIGO stage IVB (CMS/HCC)    2. Hypomagnesemia         PLAN  Please refer to previous notes for further details.  Be restarted this patient on paclitaxel and carboplatin she appears tolerated reasonably well and has responded very well.  Tumor markers have resolved back to normal from a high of 225. I discussed with her the results of this imaging studies which show very significant  improvement in the liver metastases and also the mesenteric masses in lymphadenopathy.  I would consider her to have very good partial remission.   Last week she had some discussions with nurse practitioners whether she would like to hold back on treatment.  I offered her that we can stop her treatment at this point.  I also offered her that we can delay her 1-2 more weeks to let her recover.  But she is very keen to proceed with treatment and she would like to proceed with treatment today.  This conversation was held in presence of her .  We are waiting for the CBC.  chemotherapy orders were placed in [place].  Patient will be getting her cycle 6 day 1 treatment of chemotherapy today. She will get paclitaxel and carboplatin with carboplatin at 75% of the does along with standard antiemetics .  In case if she can struggle with her fatigue and shortness of breath we may consider giving her 1 unit of packed red blood cell transfusion.  She is very reluctant to receive that.    I plan to see her back in [place] in about 1 week (around 6/24/2020). with repeat CBC chemistry magnesium. In case she has recurrent infections or change in clinical status she will contact our office.    All of the patient's questions were answered to her satisfaction.

## 2020-07-13 ENCOUNTER — APPOINTMENT (OUTPATIENT)
Dept: FAMILY MEDICINE | Facility: CLINIC | Age: 65
End: 2020-07-13
Payer: MEDICARE

## 2020-07-13 VITALS
OXYGEN SATURATION: 98 % | RESPIRATION RATE: 14 BRPM | HEART RATE: 71 BPM | DIASTOLIC BLOOD PRESSURE: 80 MMHG | WEIGHT: 258 LBS | BODY MASS INDEX: 32.08 KG/M2 | TEMPERATURE: 98.2 F | HEIGHT: 75 IN | SYSTOLIC BLOOD PRESSURE: 130 MMHG

## 2020-07-13 DIAGNOSIS — R74.8 ABNORMAL LEVELS OF OTHER SERUM ENZYMES: ICD-10-CM

## 2020-07-13 DIAGNOSIS — R89.9 UNSPECIFIED ABNORMAL FINDING IN SPECIMENS FROM OTHER ORGANS, SYSTEMS AND TISSUES: ICD-10-CM

## 2020-07-13 DIAGNOSIS — Z87.820 PERSONAL HISTORY OF TRAUMATIC BRAIN INJURY: ICD-10-CM

## 2020-07-13 DIAGNOSIS — D64.9 ANEMIA, UNSPECIFIED: ICD-10-CM

## 2020-07-13 DIAGNOSIS — R31.21 ASYMPTOMATIC MICROSCOPIC HEMATURIA: ICD-10-CM

## 2020-07-13 DIAGNOSIS — Z23 ENCOUNTER FOR IMMUNIZATION: ICD-10-CM

## 2020-07-13 DIAGNOSIS — R97.20 ELEVATED PROSTATE, SPECIFIC ANTIGEN [PSA]: ICD-10-CM

## 2020-07-13 DIAGNOSIS — E78.00 PURE HYPERCHOLESTEROLEMIA, UNSPECIFIED: ICD-10-CM

## 2020-07-13 DIAGNOSIS — E03.9 HYPOTHYROIDISM, UNSPECIFIED: ICD-10-CM

## 2020-07-13 DIAGNOSIS — R73.09 OTHER ABNORMAL GLUCOSE: ICD-10-CM

## 2020-07-13 DIAGNOSIS — Z00.00 ENCOUNTER FOR GENERAL ADULT MEDICAL EXAMINATION W/OUT ABNORMAL FINDINGS: ICD-10-CM

## 2020-07-13 PROCEDURE — G0438: CPT

## 2020-07-13 RX ORDER — OSELTAMIVIR PHOSPHATE 75 MG/1
75 CAPSULE ORAL TWICE DAILY
Qty: 1 | Refills: 0 | Status: DISCONTINUED | COMMUNITY
Start: 2020-02-14 | End: 2020-07-13

## 2020-07-13 RX ORDER — VALSARTAN 80 MG/1
80 TABLET, COATED ORAL
Qty: 90 | Refills: 0 | Status: ACTIVE | COMMUNITY
Start: 2020-02-18

## 2020-07-13 RX ORDER — ATORVASTATIN CALCIUM 20 MG/1
20 TABLET, FILM COATED ORAL
Qty: 90 | Refills: 0 | Status: ACTIVE | COMMUNITY
Start: 2020-06-18

## 2020-07-13 RX ORDER — AZITHROMYCIN 250 MG/1
250 TABLET, FILM COATED ORAL
Qty: 1 | Refills: 0 | Status: DISCONTINUED | COMMUNITY
Start: 2020-02-15 | End: 2020-07-13

## 2020-07-13 NOTE — REVIEW OF SYSTEMS
[Chills] : no chills [Postnasal Drip] : no postnasal drip [Joint Pain] : joint pain [Joint Stiffness] : joint stiffness [Negative] : Heme/Lymph

## 2020-07-13 NOTE — HEALTH RISK ASSESSMENT
[Very Good] : ~his/her~  mood as very good [Yes] : Yes [2 - 3 times a week (3 pts)] : 2 - 3  times a week (3 points) [1 or 2 (0 pts)] : 1 or 2 (0 points) [Never (0 pts)] : Never (0 points) [No] : In the past 12 months have you used drugs other than those required for medical reasons? No [Any fall with injury in past year] : Patient reported fall with injury in the past year [0] : 2) Feeling down, depressed, or hopeless: Not at all (0) [Patient reported colonoscopy was normal] : Patient reported colonoscopy was normal [None] : None [With Significant Other] : lives with significant other [Retired] : retired [Graduate School] : graduate school [] :  [# Of Children ___] : has [unfilled] children [Feels Safe at Home] : Feels safe at home [Sexually Active] : sexually active [Fully functional (bathing, dressing, toileting, transferring, walking, feeding)] : Fully functional (bathing, dressing, toileting, transferring, walking, feeding) [Fully functional (using the telephone, shopping, preparing meals, housekeeping, doing laundry, using] : Fully functional and needs no help or supervision to perform IADLs (using the telephone, shopping, preparing meals, housekeeping, doing laundry, using transportation, managing medications and managing finances) [Reports changes in dental health] : Reports changes in dental health [Reports changes in vision] : Reports changes in vision [Reports normal functional visual acuity (ie: able to read med bottle)] : Reports normal functional visual acuity [Smoke Detector] : smoke detector [Carbon Monoxide Detector] : carbon monoxide detector [Safety elements used in home] : safety elements used in home [Seat Belt] :  uses seat belt [Sunscreen] : uses sunscreen [Name: ___] : Health Care Proxy's Name: [unfilled]  [Designated Healthcare Proxy] : Designated healthcare proxy [Relationship: ___] : Relationship: [unfilled] [Audit-CScore] : 3 [] : No [de-identified] : Low carb [de-identified] : does regular exercise [SVC8Ofexy] : 0 [Change in mental status noted] : No change in mental status noted [Language] : denies difficulty with language [Behavior] : denies difficulty with behavior [Handling Complex Tasks] : denies difficulty handling complex tasks [Learning/Retaining New Information] : denies difficulty learning/retaining new information [Reasoning] : denies difficulty with reasoning [Spatial Ability and Orientation] : denies difficulty with spatial ability and orientation [Guns at Home] : no guns at home [Reports changes in hearing] : Reports no changes in hearing [Travel to Developing Areas] : does not  travel to developing areas [TB Exposure] : is not being exposed to tuberculosis [ColonoscopyDate] : 2013 [HIVComments] : NR [HepatitisCComments] : NR [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

## 2020-07-13 NOTE — PHYSICAL EXAM
[No Acute Distress] : no acute distress [Well Developed] : well developed [Well-Appearing] : well-appearing [Well Nourished] : well nourished [Normal Sclera/Conjunctiva] : normal sclera/conjunctiva [EOMI] : extraocular movements intact [PERRL] : pupils equal round and reactive to light [Normal Outer Ear/Nose] : the outer ears and nose were normal in appearance [Normal Oropharynx] : the oropharynx was normal [No JVD] : no jugular venous distention [Supple] : supple [No Lymphadenopathy] : no lymphadenopathy [Thyroid Normal, No Nodules] : the thyroid was normal and there were no nodules present [No Respiratory Distress] : no respiratory distress  [No Accessory Muscle Use] : no accessory muscle use [Clear to Auscultation] : lungs were clear to auscultation bilaterally [Normal Rate] : normal rate  [Regular Rhythm] : with a regular rhythm [No Carotid Bruits] : no carotid bruits [No Murmur] : no murmur heard [Normal S1, S2] : normal S1 and S2 [No Varicosities] : no varicosities [No Abdominal Bruit] : a ~M bruit was not heard ~T in the abdomen [No Edema] : there was no peripheral edema [Pedal Pulses Present] : the pedal pulses are present [No Extremity Clubbing/Cyanosis] : no extremity clubbing/cyanosis [No Palpable Aorta] : no palpable aorta [Non Tender] : non-tender [Non-distended] : non-distended [Soft] : abdomen soft [Normal Bowel Sounds] : normal bowel sounds [No Masses] : no abdominal mass palpated [No HSM] : no HSM [Normal Anterior Cervical Nodes] : no anterior cervical lymphadenopathy [Normal Posterior Cervical Nodes] : no posterior cervical lymphadenopathy [No Spinal Tenderness] : no spinal tenderness [No CVA Tenderness] : no CVA  tenderness [No Joint Swelling] : no joint swelling [No Rash] : no rash [Grossly Normal Strength/Tone] : grossly normal strength/tone [No Focal Deficits] : no focal deficits [Coordination Grossly Intact] : coordination grossly intact [Normal Gait] : normal gait [Deep Tendon Reflexes (DTR)] : deep tendon reflexes were 2+ and symmetric [Speech Grossly Normal] : speech grossly normal [Memory Grossly Normal] : memory grossly normal [Alert and Oriented x3] : oriented to person, place, and time [Normal Affect] : the affect was normal [Normal Insight/Judgement] : insight and judgment were intact [Normal Mood] : the mood was normal [de-identified] : as above [FreeTextEntry1] : seen by urology

## 2020-07-13 NOTE — ASSESSMENT
[FreeTextEntry1] : Diet/ exercise/ medication use/ fall and COVID19 precautions  reviewed.  He doesn't do vaccinations .  Lab rx for lab due to prior costs with lab done here.  WE discussed his need to follow up with Urology and he agreed he will.  He is due for colonoscopy and he will contact Dr MILVIA Polanco.

## 2020-07-28 ENCOUNTER — APPOINTMENT (OUTPATIENT)
Dept: NEUROLOGY | Facility: CLINIC | Age: 65
End: 2020-07-28
Payer: MEDICARE

## 2020-07-28 VITALS
BODY MASS INDEX: 31.08 KG/M2 | DIASTOLIC BLOOD PRESSURE: 91 MMHG | WEIGHT: 250 LBS | HEART RATE: 85 BPM | HEIGHT: 75 IN | SYSTOLIC BLOOD PRESSURE: 157 MMHG

## 2020-07-28 VITALS — TEMPERATURE: 98 F

## 2020-07-28 DIAGNOSIS — R41.3 OTHER AMNESIA: ICD-10-CM

## 2020-07-28 PROCEDURE — 99214 OFFICE O/P EST MOD 30 MIN: CPT

## 2020-07-28 NOTE — HISTORY OF PRESENT ILLNESS
[FreeTextEntry1] : Patient reports that since last visit he overall has been doing well although he has been having more episodes of urine and fecal urgency. \par These were similar symptoms he had when he was initially diagnosed. \par \par He also has headaches occasionally about 5/10 and visual changes intermittently but no nausea or vomiting.\par \par He also feels that his short term memory has been getting worse.

## 2020-07-28 NOTE — REVIEW OF SYSTEMS
[Fever] : no fever [Chills] : no chills [Feeling Tired] : feeling tired [Sleep Disturbances] : no sleep disturbances [Memory Lapses or Loss] : memory loss [Repeating Questions] : repeated questioning about recent events [Poor Coordination] : poor coordination [Tension Headache] : tension-type headaches [Lightheadedness] : no lightheadedness [Dizziness] : no dizziness [Difficulty Walking] : no difficulty walking [As Noted in HPI] : as noted in HPI [Negative] : Heme/Lymph

## 2020-07-28 NOTE — DISCUSSION/SUMMARY
[FreeTextEntry1] : 1. Hydrocephalus and  shunt with now mild headaches and urine and fecal urgency : will obtain MRI brain without contrast and then reset the shunt to 1.5\par \par 2. Memory changes : will arrange for formal neurocognitive changes \par \par 3. Follow up in 4 months

## 2020-07-28 NOTE — PHYSICAL EXAM
[Oriented To Time, Place, And Person] : oriented to person, place, and time [General Appearance - Alert] : alert [Person] : oriented to person [Time] : oriented to time [Place] : oriented to place [Cranial Nerves Optic (II)] : visual acuity intact bilaterally,  visual fields full to confrontation, pupils equal round and reactive to light [Cranial Nerves Oculomotor (III)] : extraocular motion intact [Cranial Nerves Facial (VII)] : face symmetrical [Cranial Nerves Trigeminal (V)] : facial sensation intact symmetrically [Cranial Nerves Glossopharyngeal (IX)] : tongue and palate midline [Cranial Nerves Accessory (XI - Cranial And Spinal)] : head turning and shoulder shrug symmetric [Cranial Nerves Vestibulocochlear (VIII)] : hearing was intact bilaterally [Cranial Nerves Hypoglossal (XII)] : there was no tongue deviation with protrusion [Motor Tone] : muscle tone was normal in all four extremities [Motor Strength] : muscle strength was normal in all four extremities [Involuntary Movements] : no involuntary movements were seen [No Muscle Atrophy] : normal bulk in all four extremities [Motor Handedness Right-Handed] : the patient is right hand dominant [Paresis Pronator Drift Right-Sided] : no pronator drift on the right [Romberg's Sign] : a positive Romberg's sign was present [Paresis Pronator Drift Left-Sided] : no pronator drift on the left [Sensation Tactile Decrease] : light touch was intact [Past-pointing] : there was no past-pointing [Limited Balance] : balance was intact [Tremor] : no tremor present [Coordination - Dysmetria Impaired Finger-to-Nose Bilateral] : not present [3+] : Biceps left 3+ [2+] : Brachioradialis left 2+ [1+] : Ankle jerk left 1+ [0] : Ankle jerk right 0 [Plantar Reflex Right Only] : normal on the right [Plantar Reflex Left Only] : normal on the left [Extraocular Movements] : extraocular movements were intact [Neck Appearance] : the appearance of the neck was normal [Skin Lesions] : no skin lesions [] : no rash

## 2020-08-03 ENCOUNTER — APPOINTMENT (OUTPATIENT)
Dept: NEUROSURGERY | Facility: CLINIC | Age: 65
End: 2020-08-03
Payer: MEDICARE

## 2020-08-03 ENCOUNTER — OUTPATIENT (OUTPATIENT)
Dept: OUTPATIENT SERVICES | Facility: HOSPITAL | Age: 65
LOS: 1 days | End: 2020-08-03
Payer: MEDICARE

## 2020-08-03 ENCOUNTER — APPOINTMENT (OUTPATIENT)
Dept: MRI IMAGING | Facility: CLINIC | Age: 65
End: 2020-08-03
Payer: MEDICARE

## 2020-08-03 VITALS
SYSTOLIC BLOOD PRESSURE: 148 MMHG | WEIGHT: 255 LBS | BODY MASS INDEX: 31.71 KG/M2 | HEIGHT: 75 IN | HEART RATE: 73 BPM | DIASTOLIC BLOOD PRESSURE: 81 MMHG

## 2020-08-03 VITALS — TEMPERATURE: 97.9 F

## 2020-08-03 DIAGNOSIS — Z98.2 PRESENCE OF CEREBROSPINAL FLUID DRAINAGE DEVICE: Chronic | ICD-10-CM

## 2020-08-03 DIAGNOSIS — G91.9 HYDROCEPHALUS, UNSPECIFIED: ICD-10-CM

## 2020-08-03 PROCEDURE — 70551 MRI BRAIN STEM W/O DYE: CPT

## 2020-08-03 PROCEDURE — 62252 CSF SHUNT REPROGRAM: CPT

## 2020-08-03 PROCEDURE — 99212 OFFICE O/P EST SF 10 MIN: CPT | Mod: 25

## 2020-08-03 PROCEDURE — 70551 MRI BRAIN STEM W/O DYE: CPT | Mod: 26

## 2020-08-03 NOTE — PROCEDURE
[FreeTextEntry1] :  shunt identified at right temporal area. Using Strata programable wand, it was noted to be between 1.5 to 2.0. It was then reprogrammed to 1.5 without difficulty. Setting was confirmed twice. Pt tolerated well.

## 2020-08-03 NOTE — ASSESSMENT
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 He had MRI done today c/o Dr. Godwin and is here today for  shunt adjustment. Shunt was reset to 1.5 without difficulty\par \par Plan:\par - f/u with Dr. Godwin\par - Pt wishes to continue his neurosurgical care with Dr. Tinajero\par \par

## 2020-08-03 NOTE — HISTORY OF PRESENT ILLNESS
[FreeTextEntry1] : The patient was a long standing pt of Dr. Valentine and Dr. Tinajero. He carries a diagnosis of hydrocephalus secondary to aqueduct stenosis. He is  shunt dependant and has underwent multiple shunt revisions. Last was in 2017. \par \par He has been doing well. He transferred his neurological care to Dr. Godwin after Dr. Valentine retired. He has not seen Dr. Tinajero since the last surgery. He would like to continue his care with Dr. Tinajero if needed. \par \par Recently he has started to have persistent right frontal headache and increasing poor memory. He had MRI brain done today and is here for  shunt evaluation and adjustment.

## 2020-08-03 NOTE — PHYSICAL EXAM
[General Appearance - Alert] : alert [General Appearance - In No Acute Distress] : in no acute distress [General Appearance - Well Nourished] : well nourished [General Appearance - Well Developed] : well developed [Oriented To Time, Place, And Person] : oriented to person, place, and time [Impaired Insight] : insight and judgment were intact [Affect] : the affect was normal [Mood] : the mood was normal [Person] : oriented to person [Place] : oriented to place [Short Term Intact] : short term memory intact [Time] : oriented to time [Remote Intact] : remote memory intact [Motor Tone] : muscle tone was normal in all four extremities [Motor Strength] : muscle strength was normal in all four extremities [Balance] : balance was intact [Limited Balance] : balance was intact [Tremor] : no tremor present [No Visual Abnormalities] : no visible abnormalities [PERRL With Normal Accommodation] : pupils were equal in size, round, reactive to light, with normal accommodation [Sclera] : the sclera and conjunctiva were normal [Extraocular Movements] : extraocular movements were intact [Full Visual Field] : full visual field [Outer Ear] : the ears and nose were normal in appearance [Hearing Threshold Finger Rub Not Licking] : hearing was normal [Neck Appearance] : the appearance of the neck was normal [] : no respiratory distress [No Spinal Tenderness] : no spinal tenderness [Exaggerated Use Of Accessory Muscles For Inspiration] : no accessory muscle use [Involuntary Movements] : no involuntary movements were seen [Abnormal Walk] : normal gait [Musculoskeletal - Swelling] : no joint swelling seen [Skin Color & Pigmentation] : normal skin color and pigmentation [Skin Turgor] : normal skin turgor

## 2020-11-24 ENCOUNTER — APPOINTMENT (OUTPATIENT)
Dept: NEUROLOGY | Facility: CLINIC | Age: 65
End: 2020-11-24
Payer: MEDICARE

## 2020-11-24 VITALS — DIASTOLIC BLOOD PRESSURE: 102 MMHG | HEART RATE: 74 BPM | RESPIRATION RATE: 16 BRPM | SYSTOLIC BLOOD PRESSURE: 164 MMHG

## 2020-11-24 VITALS
WEIGHT: 262 LBS | HEART RATE: 73 BPM | DIASTOLIC BLOOD PRESSURE: 94 MMHG | SYSTOLIC BLOOD PRESSURE: 166 MMHG | BODY MASS INDEX: 32.75 KG/M2

## 2020-11-24 VITALS — HEIGHT: 75 IN

## 2020-11-24 DIAGNOSIS — G44.89 OTHER HEADACHE SYNDROME: ICD-10-CM

## 2020-11-24 PROCEDURE — 99214 OFFICE O/P EST MOD 30 MIN: CPT

## 2020-11-24 NOTE — REVIEW OF SYSTEMS
[Fever] : no fever [Chills] : no chills [As Noted in HPI] : as noted in HPI [Poor Coordination] : good coordination [Abnormal Sensation] : no abnormal sensation [Dizziness] : no dizziness [Lightheadedness] : no lightheadedness [Migraine Headache] : migraine headaches [Tension Headache] : tension-type headaches [Difficulty Walking] : no difficulty walking [Incontinence] : incontinence [Negative] : Heme/Lymph

## 2020-11-24 NOTE — HISTORY OF PRESENT ILLNESS
[FreeTextEntry1] : Patient reports that since last visit he has been having for the past 2-3 weeks almost daily over the right frontal area, and right retro orbital region and is localized there and it is more of a stabbing and electric pain. This is the similar headache that he gets hen he is over shunted.\par He also has light sensitivity as well and loud noises and nausea but no vomiting. \par \par His current headache is about 4-5/10. He has been using aleve 600mg  and excedrin migraine with some relief. \par He denies any numbness or weakness or tingling and his balance has been of as well. He reports intermittent urine incontinence but this is not new.

## 2020-11-24 NOTE — PHYSICAL EXAM
[General Appearance - Alert] : alert [Oriented To Time, Place, And Person] : oriented to person, place, and time [Person] : oriented to person [Place] : oriented to place [Time] : oriented to time [Cranial Nerves Optic (II)] : visual acuity intact bilaterally,  visual fields full to confrontation, pupils equal round and reactive to light [Cranial Nerves Oculomotor (III)] : extraocular motion intact [Cranial Nerves Trigeminal (V)] : facial sensation intact symmetrically [Cranial Nerves Facial (VII)] : face symmetrical [Cranial Nerves Vestibulocochlear (VIII)] : hearing was intact bilaterally [Cranial Nerves Glossopharyngeal (IX)] : tongue and palate midline [Cranial Nerves Accessory (XI - Cranial And Spinal)] : head turning and shoulder shrug symmetric [Cranial Nerves Hypoglossal (XII)] : there was no tongue deviation with protrusion [Motor Tone] : muscle tone was normal in all four extremities [Motor Strength] : muscle strength was normal in all four extremities [Involuntary Movements] : no involuntary movements were seen [No Muscle Atrophy] : normal bulk in all four extremities [Motor Handedness Right-Handed] : the patient is right hand dominant [Paresis Pronator Drift Right-Sided] : no pronator drift on the right [Paresis Pronator Drift Left-Sided] : no pronator drift on the left [Sensation Tactile Decrease] : light touch was intact [Romberg's Sign] : a positive Romberg's sign was present [Limited Balance] : balance was intact [Past-pointing] : there was no past-pointing [Tremor] : no tremor present [Coordination - Dysmetria Impaired Finger-to-Nose Bilateral] : not present [3+] : Biceps left 3+ [0] : Ankle jerk right 0 [2+] : Ankle jerk left 2+ [Plantar Reflex Right Only] : normal on the right [Plantar Reflex Left Only] : normal on the left [FreeTextEntry8] : Tandem gait was slightly off today [Extraocular Movements] : extraocular movements were intact [Neck Appearance] : the appearance of the neck was normal [] : no rash [Skin Lesions] : no skin lesions

## 2020-11-24 NOTE — DISCUSSION/SUMMARY
[FreeTextEntry1] : 1. Patient with hx of hydrocephalus and  shunt placement: will obtain MRI brain without contrast given recurrence of headaches and urine incontinence and poor balance. \par For the headaches may continue NSAIDs and tylenol\par \par 2. His  shunt is set at 1.5 and will need to be adjusted after MRI brain \par \par 3. Follow up with Dr. Franco Tinajero \par \par 4. Follow up in 3 months and all questions answered.

## 2020-11-30 ENCOUNTER — APPOINTMENT (OUTPATIENT)
Dept: MRI IMAGING | Facility: CLINIC | Age: 65
End: 2020-11-30
Payer: MEDICARE

## 2020-11-30 ENCOUNTER — APPOINTMENT (OUTPATIENT)
Dept: NEUROSURGERY | Facility: CLINIC | Age: 65
End: 2020-11-30
Payer: MEDICARE

## 2020-11-30 ENCOUNTER — OUTPATIENT (OUTPATIENT)
Dept: OUTPATIENT SERVICES | Facility: HOSPITAL | Age: 65
LOS: 1 days | End: 2020-11-30
Payer: MEDICARE

## 2020-11-30 VITALS
RESPIRATION RATE: 17 BRPM | DIASTOLIC BLOOD PRESSURE: 89 MMHG | BODY MASS INDEX: 32.58 KG/M2 | WEIGHT: 262 LBS | HEIGHT: 75 IN | HEART RATE: 70 BPM | OXYGEN SATURATION: 96 % | SYSTOLIC BLOOD PRESSURE: 151 MMHG

## 2020-11-30 DIAGNOSIS — Z98.2 PRESENCE OF CEREBROSPINAL FLUID DRAINAGE DEVICE: Chronic | ICD-10-CM

## 2020-11-30 DIAGNOSIS — G44.89 OTHER HEADACHE SYNDROME: ICD-10-CM

## 2020-11-30 PROCEDURE — 70551 MRI BRAIN STEM W/O DYE: CPT

## 2020-11-30 PROCEDURE — 99212 OFFICE O/P EST SF 10 MIN: CPT

## 2020-11-30 PROCEDURE — 70551 MRI BRAIN STEM W/O DYE: CPT | Mod: 26

## 2020-11-30 NOTE — ASSESSMENT
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 He had MRI done today c/o Dr. Godwin and is here today for  shunt adjustment. Shunt was reset to 1.5. Setting confirmed\par \par Plan:\par - f/u with Dr. Godwin\par - Pt wishes to continue his neurosurgical care with Dr. Tinajero\par \par

## 2020-11-30 NOTE — PHYSICAL EXAM
[General Appearance - Alert] : alert [General Appearance - In No Acute Distress] : in no acute distress [General Appearance - Well Nourished] : well nourished [General Appearance - Well Developed] : well developed [Oriented To Time, Place, And Person] : oriented to person, place, and time [Impaired Insight] : insight and judgment were intact [Affect] : the affect was normal [Mood] : the mood was normal [Motor Tone] : muscle tone was normal in all four extremities [Motor Strength] : muscle strength was normal in all four extremities [Involuntary Movements] : no involuntary movements were seen [No Muscle Atrophy] : normal bulk in all four extremities

## 2020-11-30 NOTE — PROCEDURE
[FreeTextEntry1] :  shunt identified at right temporal area. Using Strata programable wand, it was noted to be between 1.5 to 2.0. It was then reprogrammed to 1.5.  Setting was confirmed twice. Pt tolerated procedure well.

## 2021-01-07 ENCOUNTER — APPOINTMENT (OUTPATIENT)
Dept: NEUROLOGY | Facility: CLINIC | Age: 66
End: 2021-01-07
Payer: MEDICARE

## 2021-01-07 PROCEDURE — 96139 PSYCL/NRPSYC TST TECH EA: CPT | Mod: NC,95

## 2021-01-07 PROCEDURE — 96132 NRPSYC TST EVAL PHYS/QHP 1ST: CPT | Mod: 95

## 2021-01-07 PROCEDURE — 96138 PSYCL/NRPSYC TECH 1ST: CPT | Mod: NC,95

## 2021-01-07 PROCEDURE — 96116 NUBHVL XM PHYS/QHP 1ST HR: CPT | Mod: 95

## 2021-01-07 PROCEDURE — 96133 NRPSYC TST EVAL PHYS/QHP EA: CPT | Mod: 95

## 2021-01-14 ENCOUNTER — APPOINTMENT (OUTPATIENT)
Dept: NEUROLOGY | Facility: CLINIC | Age: 66
End: 2021-01-14

## 2021-01-21 ENCOUNTER — APPOINTMENT (OUTPATIENT)
Dept: NEUROLOGY | Facility: CLINIC | Age: 66
End: 2021-01-21
Payer: MEDICARE

## 2021-01-21 PROCEDURE — 96133 NRPSYC TST EVAL PHYS/QHP EA: CPT | Mod: 95

## 2021-01-21 PROCEDURE — 96139 PSYCL/NRPSYC TST TECH EA: CPT | Mod: NC,95

## 2021-02-04 ENCOUNTER — APPOINTMENT (OUTPATIENT)
Dept: NEUROLOGY | Facility: CLINIC | Age: 66
End: 2021-02-04

## 2021-02-09 ENCOUNTER — OUTPATIENT (OUTPATIENT)
Dept: OUTPATIENT SERVICES | Facility: HOSPITAL | Age: 66
LOS: 1 days | End: 2021-02-09
Payer: MEDICARE

## 2021-02-09 ENCOUNTER — APPOINTMENT (OUTPATIENT)
Dept: CT IMAGING | Facility: CLINIC | Age: 66
End: 2021-02-09
Payer: MEDICARE

## 2021-02-09 ENCOUNTER — RESULT REVIEW (OUTPATIENT)
Age: 66
End: 2021-02-09

## 2021-02-09 ENCOUNTER — NON-APPOINTMENT (OUTPATIENT)
Age: 66
End: 2021-02-09

## 2021-02-09 DIAGNOSIS — Q03.0 MALFORMATIONS OF AQUEDUCT OF SYLVIUS: ICD-10-CM

## 2021-02-09 DIAGNOSIS — Z98.2 PRESENCE OF CEREBROSPINAL FLUID DRAINAGE DEVICE: Chronic | ICD-10-CM

## 2021-02-09 PROCEDURE — G1004: CPT

## 2021-02-09 PROCEDURE — 70450 CT HEAD/BRAIN W/O DYE: CPT

## 2021-02-09 PROCEDURE — 70450 CT HEAD/BRAIN W/O DYE: CPT | Mod: 26,MG

## 2021-02-11 ENCOUNTER — APPOINTMENT (OUTPATIENT)
Dept: NEUROLOGY | Facility: CLINIC | Age: 66
End: 2021-02-11
Payer: MEDICARE

## 2021-02-11 PROCEDURE — 96132 NRPSYC TST EVAL PHYS/QHP 1ST: CPT | Mod: 95

## 2021-04-20 ENCOUNTER — APPOINTMENT (OUTPATIENT)
Dept: NEUROLOGY | Facility: CLINIC | Age: 66
End: 2021-04-20
Payer: MEDICARE

## 2021-04-20 VITALS
SYSTOLIC BLOOD PRESSURE: 144 MMHG | WEIGHT: 255 LBS | HEIGHT: 75 IN | HEART RATE: 69 BPM | DIASTOLIC BLOOD PRESSURE: 80 MMHG | BODY MASS INDEX: 31.71 KG/M2

## 2021-04-20 VITALS — TEMPERATURE: 98 F

## 2021-04-20 DIAGNOSIS — G89.29 HEADACHE, UNSPECIFIED: ICD-10-CM

## 2021-04-20 DIAGNOSIS — R51.9 HEADACHE, UNSPECIFIED: ICD-10-CM

## 2021-04-20 PROCEDURE — 99214 OFFICE O/P EST MOD 30 MIN: CPT

## 2021-04-20 NOTE — PHYSICAL EXAM
[General Appearance - Alert] : alert [Oriented To Time, Place, And Person] : oriented to person, place, and time [Person] : oriented to person [Place] : oriented to place [Time] : oriented to time [Cranial Nerves Optic (II)] : visual acuity intact bilaterally,  visual fields full to confrontation, pupils equal round and reactive to light [Cranial Nerves Oculomotor (III)] : extraocular motion intact [Cranial Nerves Trigeminal (V)] : facial sensation intact symmetrically [Cranial Nerves Facial (VII)] : face symmetrical [Cranial Nerves Vestibulocochlear (VIII)] : hearing was intact bilaterally [Cranial Nerves Glossopharyngeal (IX)] : tongue and palate midline [Cranial Nerves Accessory (XI - Cranial And Spinal)] : head turning and shoulder shrug symmetric [Cranial Nerves Hypoglossal (XII)] : there was no tongue deviation with protrusion [Motor Tone] : muscle tone was normal in all four extremities [Motor Strength] : muscle strength was normal in all four extremities [Involuntary Movements] : no involuntary movements were seen [No Muscle Atrophy] : normal bulk in all four extremities [Paresis Pronator Drift Right-Sided] : no pronator drift on the right [Motor Handedness Right-Handed] : the patient is right hand dominant [Paresis Pronator Drift Left-Sided] : no pronator drift on the left [Sensation Tactile Decrease] : light touch was intact [Romberg's Sign] : a positive Romberg's sign was present [Limited Balance] : balance was intact [Past-pointing] : there was no past-pointing [Tremor] : no tremor present [Coordination - Dysmetria Impaired Finger-to-Nose Bilateral] : not present [3+] : Biceps left 3+ [0] : Ankle jerk right 0 [2+] : Ankle jerk left 2+ [Plantar Reflex Right Only] : normal on the right [Plantar Reflex Left Only] : normal on the left [FreeTextEntry8] : Tandem gait was off today  [] : no respiratory distress

## 2021-04-20 NOTE — DISCUSSION/SUMMARY
[FreeTextEntry1] : 1. Hydrocephalus: stable and will continue to monitor \par \par 2. Neurocognitive testing reviewed in detail and will print official report and no evidence of a neurocognitive disorder\par \par 3. 6 months follow up

## 2021-04-20 NOTE — REVIEW OF SYSTEMS
[Fever] : no fever [Chills] : no chills [Feeling Tired] : feeling tired [As Noted in HPI] : as noted in HPI [Memory Lapses or Loss] : memory loss [Decr. Concentrating Ability] : decreased concentrating ability [Dizziness] : no dizziness [Tension Headache] : tension-type headaches [Incontinence] : incontinence [Negative] : Heme/Lymph

## 2021-04-20 NOTE — HISTORY OF PRESENT ILLNESS
[FreeTextEntry1] : Patient reports that since last visit he has been doing well and relatively stable. He reports a mild headaches today and he will have intermittent urine incontinence. \par \par He completed his CT head in Feb and all was stable. \par \par He also completed his formal neurocognitive testing which we will review together .

## 2021-09-07 ENCOUNTER — NON-APPOINTMENT (OUTPATIENT)
Age: 66
End: 2021-09-07

## 2021-09-07 DIAGNOSIS — Z91.89 OTHER SPECIFIED PERSONAL RISK FACTORS, NOT ELSEWHERE CLASSIFIED: ICD-10-CM

## 2021-09-24 ENCOUNTER — NON-APPOINTMENT (OUTPATIENT)
Age: 66
End: 2021-09-24

## 2021-09-28 VITALS — TEMPERATURE: 96.3 F

## 2021-09-28 DIAGNOSIS — Z91.018 ALLERGY TO OTHER FOODS: ICD-10-CM

## 2021-10-27 ENCOUNTER — APPOINTMENT (OUTPATIENT)
Dept: NEUROLOGY | Facility: CLINIC | Age: 66
End: 2021-10-27

## 2022-06-17 ENCOUNTER — TRANSCRIPTION ENCOUNTER (OUTPATIENT)
Age: 67
End: 2022-06-17

## 2022-06-21 ENCOUNTER — APPOINTMENT (OUTPATIENT)
Dept: FAMILY MEDICINE | Facility: CLINIC | Age: 67
End: 2022-06-21
Payer: MEDICARE

## 2022-06-21 DIAGNOSIS — U07.1 COVID-19: ICD-10-CM

## 2022-06-21 DIAGNOSIS — R09.82 POSTNASAL DRIP: ICD-10-CM

## 2022-06-21 PROCEDURE — 99213 OFFICE O/P EST LOW 20 MIN: CPT | Mod: CS,95

## 2022-06-21 RX ORDER — DOXYCYCLINE HYCLATE 100 MG/1
100 TABLET ORAL
Qty: 42 | Refills: 0 | Status: DISCONTINUED | COMMUNITY
Start: 2021-09-24 | End: 2022-06-21

## 2022-06-21 RX ORDER — AMOXICILLIN 875 MG/1
875 TABLET, FILM COATED ORAL
Qty: 20 | Refills: 0 | Status: DISCONTINUED | COMMUNITY
Start: 2020-08-11 | End: 2022-06-21

## 2022-06-21 RX ORDER — ALBUTEROL SULFATE 90 UG/1
108 (90 BASE) INHALANT RESPIRATORY (INHALATION)
Qty: 1 | Refills: 0 | Status: DISCONTINUED | COMMUNITY
Start: 2020-02-14 | End: 2022-06-21

## 2022-06-21 RX ORDER — IPRATROPIUM BROMIDE 21 UG/1
0.03 SPRAY NASAL
Qty: 90 | Refills: 0 | Status: ACTIVE | COMMUNITY
Start: 2022-06-21 | End: 1900-01-01

## 2022-06-21 RX ORDER — AZELASTINE HYDROCHLORIDE 137 UG/1
0.1 SPRAY, METERED NASAL DAILY
Qty: 1 | Refills: 0 | Status: DISCONTINUED | COMMUNITY
Start: 2020-01-08 | End: 2022-06-21

## 2022-06-21 NOTE — HISTORY OF PRESENT ILLNESS
[Home] : at home, [unfilled] , at the time of the visit. [Medical Office: ([facility])___] : at the medical office located in  [Verbal consent obtained from patient] : the patient, [unfilled] [FreeTextEntry8] : 68 yo htn hld patient presenting for covid symptoms. \par last week started to feel symptoms of fatigue, could not get out of bed. \par On Monday, went to Urgent Care, [facility]. \par Viral work up negative, tested for covid pcr. Was called Sunday and told positive. \par Currently nasal congestion, with lots of phegm. \par Uses neti pot. \par +yellow mucous\par +PND\par +horseness \par No chest pain, dyspnea. \par No recent fevers.

## 2022-06-21 NOTE — PHYSICAL EXAM
[No Acute Distress] : no acute distress [Well Nourished] : well nourished [Well Developed] : well developed [Normal Voice/Communication] : normal voice/communication [de-identified] : speaking full sentences w/o distress

## 2022-06-21 NOTE — REVIEW OF SYSTEMS
[Postnasal Drip] : postnasal drip [Nasal Discharge] : nasal discharge [Sore Throat] : sore throat [Hoarseness] : hoarseness [Negative] : Neurological

## 2022-07-21 ENCOUNTER — APPOINTMENT (OUTPATIENT)
Dept: FAMILY MEDICINE | Facility: CLINIC | Age: 67
End: 2022-07-21

## 2022-07-21 VITALS
RESPIRATION RATE: 17 BRPM | SYSTOLIC BLOOD PRESSURE: 134 MMHG | BODY MASS INDEX: 31.71 KG/M2 | OXYGEN SATURATION: 97 % | HEIGHT: 75 IN | HEART RATE: 71 BPM | WEIGHT: 255 LBS | TEMPERATURE: 97 F | DIASTOLIC BLOOD PRESSURE: 64 MMHG

## 2022-07-21 DIAGNOSIS — L73.9 FOLLICULAR DISORDER, UNSPECIFIED: ICD-10-CM

## 2022-07-21 PROCEDURE — 99213 OFFICE O/P EST LOW 20 MIN: CPT

## 2022-07-21 RX ORDER — FLUTICASONE PROPIONATE 50 UG/1
50 SPRAY, METERED NASAL DAILY
Qty: 1 | Refills: 0 | Status: DISCONTINUED | COMMUNITY
Start: 2020-01-08 | End: 2022-07-21

## 2022-07-21 RX ORDER — METHYLPREDNISOLONE 4 MG/1
4 TABLET ORAL
Qty: 1 | Refills: 0 | Status: DISCONTINUED | COMMUNITY
Start: 2022-06-21 | End: 2022-07-21

## 2022-07-21 RX ORDER — FLUTICASONE PROPIONATE 50 UG/1
50 SPRAY, METERED NASAL
Qty: 1 | Refills: 0 | Status: DISCONTINUED | COMMUNITY
Start: 2018-11-02 | End: 2022-07-21

## 2022-07-21 RX ORDER — EPINEPHRINE 0.3 MG/.3ML
0.3 INJECTION INTRAMUSCULAR
Qty: 1 | Refills: 2 | Status: DISCONTINUED | COMMUNITY
Start: 2021-09-28 | End: 2022-07-21

## 2022-07-21 RX ORDER — TADALAFIL 20 MG/1
20 TABLET ORAL
Qty: 12 | Refills: 0 | Status: ACTIVE | COMMUNITY
Start: 2022-01-04

## 2022-07-21 NOTE — HISTORY OF PRESENT ILLNESS
[FreeTextEntry8] : Patient presents with pimples on his back and on his leg. He said they appeared around spring time last year, he used cream, they went away, then came back this spring.  \par \par he thinks he has folliculitis on his thighs and back \par \par denies fevers or chills \par one was pustular and was oozing\par

## 2022-07-21 NOTE — PHYSICAL EXAM
[Supple] : supple [Normal] : no respiratory distress, lungs were clear to auscultation bilaterally and no accessory muscle use [Normal Rate] : normal rate  [Regular Rhythm] : with a regular rhythm [Normal S1, S2] : normal S1 and S2 [Normal Affect] : the affect was normal [Normal Mood] : the mood was normal [Normal Insight/Judgement] : insight and judgment were intact [de-identified] : +multiple areas on back and thighs with erythema at hair follicle, +one on back oozing slightly

## 2022-07-21 NOTE — PLAN
[FreeTextEntry1] : \par folliculitis\par mupirocin tid x 10 days\par if no relief or worsening advised to call me and i will send po antibiotics\par \par hx hydrocephalus-stable \par seen by neurosurgeon and told to f/u 6mos\par \par he agreed w/plan

## 2022-10-12 ENCOUNTER — APPOINTMENT (OUTPATIENT)
Dept: FAMILY MEDICINE | Facility: CLINIC | Age: 67
End: 2022-10-12

## 2022-10-12 DIAGNOSIS — R10.31 RIGHT LOWER QUADRANT PAIN: ICD-10-CM

## 2022-10-12 PROCEDURE — 99441: CPT | Mod: 95

## 2022-10-18 ENCOUNTER — APPOINTMENT (OUTPATIENT)
Dept: FAMILY MEDICINE | Facility: CLINIC | Age: 67
End: 2022-10-18

## 2022-10-18 ENCOUNTER — LABORATORY RESULT (OUTPATIENT)
Age: 67
End: 2022-10-18

## 2022-10-18 VITALS
HEIGHT: 75 IN | TEMPERATURE: 97.8 F | HEART RATE: 71 BPM | OXYGEN SATURATION: 97 % | SYSTOLIC BLOOD PRESSURE: 130 MMHG | BODY MASS INDEX: 32.7 KG/M2 | WEIGHT: 263 LBS | RESPIRATION RATE: 14 BRPM | DIASTOLIC BLOOD PRESSURE: 90 MMHG

## 2022-10-18 DIAGNOSIS — Z01.818 ENCOUNTER FOR OTHER PREPROCEDURAL EXAMINATION: ICD-10-CM

## 2022-10-18 DIAGNOSIS — Q03.0 MALFORMATIONS OF AQUEDUCT OF SYLVIUS: ICD-10-CM

## 2022-10-18 DIAGNOSIS — G91.9 HYDROCEPHALUS, UNSPECIFIED: ICD-10-CM

## 2022-10-18 PROCEDURE — 99214 OFFICE O/P EST MOD 30 MIN: CPT | Mod: 25

## 2022-10-18 PROCEDURE — 36415 COLL VENOUS BLD VENIPUNCTURE: CPT

## 2022-10-20 LAB
ALBUMIN SERPL ELPH-MCNC: 4.7 G/DL
ALP BLD-CCNC: 96 U/L
ALT SERPL-CCNC: 17 U/L
ANION GAP SERPL CALC-SCNC: 12 MMOL/L
AST SERPL-CCNC: 16 U/L
BASOPHILS # BLD AUTO: 0.08 K/UL
BASOPHILS NFR BLD AUTO: 1.1 %
BILIRUB SERPL-MCNC: 1 MG/DL
BUN SERPL-MCNC: 17 MG/DL
CALCIUM SERPL-MCNC: 9.8 MG/DL
CHLORIDE SERPL-SCNC: 104 MMOL/L
CO2 SERPL-SCNC: 26 MMOL/L
CREAT SERPL-MCNC: 0.86 MG/DL
EGFR: 94 ML/MIN/1.73M2
EOSINOPHIL # BLD AUTO: 0.22 K/UL
EOSINOPHIL NFR BLD AUTO: 3 %
GLUCOSE SERPL-MCNC: 106 MG/DL
HCT VFR BLD CALC: 47.9 %
HGB BLD-MCNC: 17.3 G/DL
IMM GRANULOCYTES NFR BLD AUTO: 0.3 %
LYMPHOCYTES # BLD AUTO: 1.89 K/UL
LYMPHOCYTES NFR BLD AUTO: 25.8 %
MAN DIFF?: NORMAL
MCHC RBC-ENTMCNC: 32.3 PG
MCHC RBC-ENTMCNC: 36.1 GM/DL
MCV RBC AUTO: 89.5 FL
MONOCYTES # BLD AUTO: 0.72 K/UL
MONOCYTES NFR BLD AUTO: 9.8 %
NEUTROPHILS # BLD AUTO: 4.4 K/UL
NEUTROPHILS NFR BLD AUTO: 60 %
PLATELET # BLD AUTO: 181 K/UL
POTASSIUM SERPL-SCNC: 4.4 MMOL/L
PROT SERPL-MCNC: 6.9 G/DL
RBC # BLD: 5.35 M/UL
RBC # FLD: 13.3 %
SODIUM SERPL-SCNC: 143 MMOL/L
WBC # FLD AUTO: 7.33 K/UL

## 2022-10-20 NOTE — HISTORY OF PRESENT ILLNESS
[Recent Myocardial Infarction] : recent myocardial infarction [No Pertinent Pulmonary History] : no history of asthma, COPD, sleep apnea, or smoking [No Adverse Anesthesia Reaction] : no adverse anesthesia reaction in self or family member [Asthma] : no asthma [COPD] : no COPD [Sleep Apnea] : no sleep apnea [Smoker] : not a smoker [Family Member] : no family member with adverse anesthesia reaction/sudden death [Self] : no previous adverse anesthesia reaction [Chronic Anticoagulation] : no chronic anticoagulation [Chronic Kidney Disease] : no chronic kidney disease [Diabetes] : no diabetes [(Patient denies any chest pain, claudication, dyspnea on exertion, orthopnea, palpitations or syncope)] : Patient denies any chest pain, claudication, dyspnea on exertion, orthopnea, palpitations or syncope [Excellent (>10 METs)] : Excellent (>10 METs) [FreeTextEntry1] : Inguinal hernia repair  [FreeTextEntry2] : 10/26/2022 [FreeTextEntry4] : Patient presents for pre surgical clearance having surgery on hernia of groin at [place] had pre surgical testing done  [FreeTextEntry3] : Dr Pastrana [FreeTextEntry7] : h

## 2022-10-20 NOTE — ASSESSMENT
[Other: _____] : [unfilled] [Continue anticoagulant treatment as is] : Continue current anticoagulant treatment [Continue anti-platelet treatment as is] : Continue current anti-platelet treatment [Continue medications as is] : Continue current medications [As per surgery] : as per surgery [Patient Optimized for Surgery] : Patient optimized for surgery

## 2022-10-20 NOTE — RESULTS/DATA
[] : not indicated [de-identified] : mild elevated hgb/hct will repeat with physical likely dehydration [de-identified] : nl [de-identified] : nl [de-identified] : cleared by cardio

## 2022-10-21 ENCOUNTER — NON-APPOINTMENT (OUTPATIENT)
Age: 67
End: 2022-10-21

## 2022-10-21 DIAGNOSIS — D18.03 HEMANGIOMA OF INTRA-ABDOMINAL STRUCTURES: ICD-10-CM

## 2022-10-24 ENCOUNTER — TRANSCRIPTION ENCOUNTER (OUTPATIENT)
Age: 67
End: 2022-10-24

## 2022-10-24 LAB
TESTOST FREE SERPL-MCNC: 18 PG/ML
TESTOST SERPL-MCNC: 330 NG/DL

## 2022-10-26 ENCOUNTER — RESULT REVIEW (OUTPATIENT)
Age: 67
End: 2022-10-26

## 2023-01-19 ENCOUNTER — APPOINTMENT (OUTPATIENT)
Dept: FAMILY MEDICINE | Facility: CLINIC | Age: 68
End: 2023-01-19
Payer: MEDICARE

## 2023-01-19 VITALS — TEMPERATURE: 97.5 F

## 2023-01-19 VITALS
OXYGEN SATURATION: 98 % | HEIGHT: 75 IN | BODY MASS INDEX: 31.71 KG/M2 | RESPIRATION RATE: 12 BRPM | HEART RATE: 71 BPM | DIASTOLIC BLOOD PRESSURE: 80 MMHG | WEIGHT: 255 LBS | SYSTOLIC BLOOD PRESSURE: 120 MMHG

## 2023-01-19 DIAGNOSIS — R05.9 COUGH, UNSPECIFIED: ICD-10-CM

## 2023-01-19 PROCEDURE — 99213 OFFICE O/P EST LOW 20 MIN: CPT

## 2023-01-19 NOTE — HISTORY OF PRESENT ILLNESS
[FreeTextEntry8] : pt c/o sinus pressure +dry cough  and pnd x 4 days \par \par we went skiing in [place] yesterday.daughter  in law was sick

## 2023-01-19 NOTE — PHYSICAL EXAM
[Normal] : normal rate, regular rhythm, normal S1 and S2 and no murmur heard [de-identified] :  nasal passages erythema and purulent discharge, frontal sinuses are tender bilaterally. postnasal drip.

## 2023-01-19 NOTE — ASSESSMENT
[FreeTextEntry1] : Take antibiotics,  rest,  increase fluids, wash hands to prevent the spread of  infection . Take mucinex   over the counter. Take tylenol or advil for fever or body aches. Follow up if no better or worse respiratory symptoms.\par Labs drawn/ specimens obtained  in office on this date of service  for evaluation of   assessed conditions -  covid swab    to be run at [facility].\par

## 2023-01-19 NOTE — REVIEW OF SYSTEMS
[Fatigue] : fatigue [Postnasal Drip] : postnasal drip [Nasal Discharge] : nasal discharge [Hoarseness] : hoarseness [Negative] : Respiratory

## 2023-01-20 ENCOUNTER — TRANSCRIPTION ENCOUNTER (OUTPATIENT)
Age: 68
End: 2023-01-20

## 2023-01-20 LAB
RAPID RVP RESULT: NOT DETECTED
SARS-COV-2 RNA PNL RESP NAA+PROBE: NOT DETECTED

## 2023-02-13 ENCOUNTER — RX CHANGE (OUTPATIENT)
Age: 68
End: 2023-02-13

## 2023-02-13 RX ORDER — IPRATROPIUM BROMIDE 42 UG/1
0.06 SPRAY NASAL
Qty: 15 | Refills: 11 | Status: DISCONTINUED | COMMUNITY
Start: 2023-01-19 | End: 2023-02-13

## 2023-02-15 ENCOUNTER — APPOINTMENT (OUTPATIENT)
Dept: FAMILY MEDICINE | Facility: CLINIC | Age: 68
End: 2023-02-15
Payer: MEDICARE

## 2023-02-15 DIAGNOSIS — J01.90 ACUTE SINUSITIS, UNSPECIFIED: ICD-10-CM

## 2023-02-15 DIAGNOSIS — R06.2 WHEEZING: ICD-10-CM

## 2023-02-15 PROCEDURE — 99212 OFFICE O/P EST SF 10 MIN: CPT | Mod: 95

## 2023-02-15 RX ORDER — AMOXICILLIN AND CLAVULANATE POTASSIUM 875; 125 MG/1; MG/1
875-125 TABLET, COATED ORAL
Qty: 20 | Refills: 0 | Status: DISCONTINUED | COMMUNITY
Start: 2023-01-19 | End: 2023-02-15

## 2023-02-15 RX ORDER — BENZONATATE 100 MG/1
100 CAPSULE ORAL
Qty: 30 | Refills: 0 | Status: ACTIVE | COMMUNITY
Start: 2020-02-14 | End: 1900-01-01

## 2023-02-15 NOTE — HISTORY OF PRESENT ILLNESS
[Home] : at home, [unfilled] , at the time of the visit. [Medical Office: ([facility])___] : at the medical office located in  [Verbal consent obtained from patient] : the patient, [unfilled] [FreeTextEntry8] : After receiving verbal consent the visit was performed virtually via [employer] as the patient was unable to be seen in the office due to the COVID 19 pandemic.  He reports that he was feeling better after his last sinus infection and his symptoms had resolved.  Over the weekend he developed nasal congestion and a cough and now has a lot of tightness in his chest.  His mucus has been clear and now is turning discolored.  He denies any recent fever\par

## 2023-02-15 NOTE — PLAN
[FreeTextEntry1] : he was advised to take medications as prescribed and to follow up in 5 days if unimproved or sooner prn worsening

## 2023-02-15 NOTE — PHYSICAL EXAM
[No Acute Distress] : no acute distress [No Respiratory Distress] : no respiratory distress  [Normal Mood] : the mood was normal

## 2023-02-15 NOTE — REVIEW OF SYSTEMS
[Fatigue] : fatigue [Postnasal Drip] : postnasal drip [Nasal Discharge] : nasal discharge [Shortness Of Breath] : shortness of breath [Wheezing] : wheezing [Cough] : cough [Headache] : headache [Negative] : Heme/Lymph [Fever] : no fever [Chills] : no chills

## 2023-05-15 NOTE — DISCHARGE NOTE ADULT - VISION (WITH CORRECTIVE LENSES IF THE PATIENT USUALLY WEARS THEM):
glasses/Normal vision: sees adequately in most situations; can see medication labels, newsprint
reviewed

## 2023-08-11 ENCOUNTER — TRANSCRIPTION ENCOUNTER (OUTPATIENT)
Age: 68
End: 2023-08-11

## 2023-08-11 RX ORDER — DOXYCYCLINE HYCLATE 100 MG/1
100 TABLET ORAL
Qty: 42 | Refills: 0 | Status: ACTIVE | COMMUNITY
Start: 2023-08-11 | End: 1900-01-01

## 2023-09-20 ENCOUNTER — TRANSCRIPTION ENCOUNTER (OUTPATIENT)
Age: 68
End: 2023-09-20

## 2023-09-29 ENCOUNTER — TRANSCRIPTION ENCOUNTER (OUTPATIENT)
Age: 68
End: 2023-09-29

## 2023-09-29 RX ORDER — EPINEPHRINE 0.3 MG/.3ML
0.3 INJECTION INTRAMUSCULAR
Qty: 1 | Refills: 5 | Status: ACTIVE | COMMUNITY
Start: 2023-09-29 | End: 1900-01-01

## 2023-10-11 ENCOUNTER — TRANSCRIPTION ENCOUNTER (OUTPATIENT)
Age: 68
End: 2023-10-11

## 2023-10-12 ENCOUNTER — TRANSCRIPTION ENCOUNTER (OUTPATIENT)
Age: 68
End: 2023-10-12

## 2023-10-13 ENCOUNTER — TRANSCRIPTION ENCOUNTER (OUTPATIENT)
Age: 68
End: 2023-10-13

## 2023-10-17 ENCOUNTER — TRANSCRIPTION ENCOUNTER (OUTPATIENT)
Age: 68
End: 2023-10-17

## 2023-10-19 NOTE — PROGRESS NOTE ADULT - PROBLEM SELECTOR PROBLEM 1
Hydrocephalus
Hydrocephalus
Low Dose Naltrexone Counseling- I discussed with the patient the potential risks and side effects of low dose naltrexone including but not limited to: more vivid dreams, headaches, nausea, vomiting, abdominal pain, fatigue, dizziness, and anxiety.

## 2023-10-25 ENCOUNTER — APPOINTMENT (OUTPATIENT)
Dept: INFECTIOUS DISEASE | Facility: CLINIC | Age: 68
End: 2023-10-25
Payer: MEDICARE

## 2023-10-25 ENCOUNTER — NON-APPOINTMENT (OUTPATIENT)
Age: 68
End: 2023-10-25

## 2023-10-25 VITALS
HEIGHT: 75 IN | OXYGEN SATURATION: 98 % | WEIGHT: 255 LBS | RESPIRATION RATE: 15 BRPM | DIASTOLIC BLOOD PRESSURE: 82 MMHG | BODY MASS INDEX: 31.71 KG/M2 | SYSTOLIC BLOOD PRESSURE: 140 MMHG | TEMPERATURE: 98.4 F | HEART RATE: 80 BPM

## 2023-10-25 DIAGNOSIS — A69.20 LYME DISEASE, UNSPECIFIED: ICD-10-CM

## 2023-10-25 PROCEDURE — 99204 OFFICE O/P NEW MOD 45 MIN: CPT

## 2023-10-25 RX ORDER — AZITHROMYCIN 250 MG/1
250 TABLET, FILM COATED ORAL
Qty: 6 | Refills: 0 | Status: DISCONTINUED | COMMUNITY
Start: 2023-02-15 | End: 2023-10-25

## 2023-10-25 RX ORDER — MUPIROCIN 20 MG/G
2 OINTMENT TOPICAL 3 TIMES DAILY
Qty: 1 | Refills: 0 | Status: DISCONTINUED | COMMUNITY
Start: 2022-07-21 | End: 2023-10-25

## 2023-10-25 RX ORDER — ALBUTEROL SULFATE 90 UG/1
108 (90 BASE) INHALANT RESPIRATORY (INHALATION)
Qty: 1 | Refills: 11 | Status: DISCONTINUED | COMMUNITY
Start: 2023-02-15 | End: 2023-10-25

## 2023-10-25 RX ORDER — AMOXICILLIN 500 MG/1
500 CAPSULE ORAL 3 TIMES DAILY
Qty: 42 | Refills: 0 | Status: ACTIVE | COMMUNITY
Start: 2023-10-25 | End: 1900-01-01

## 2023-10-25 RX ORDER — IPRATROPIUM BROMIDE 42 UG/1
0.06 SPRAY NASAL
Qty: 45 | Refills: 4 | Status: DISCONTINUED | COMMUNITY
Start: 2023-02-13 | End: 2023-10-25

## 2023-11-14 ENCOUNTER — TRANSCRIPTION ENCOUNTER (OUTPATIENT)
Age: 68
End: 2023-11-14

## 2023-12-26 ENCOUNTER — TRANSCRIPTION ENCOUNTER (OUTPATIENT)
Age: 68
End: 2023-12-26

## 2023-12-27 ENCOUNTER — TRANSCRIPTION ENCOUNTER (OUTPATIENT)
Age: 68
End: 2023-12-27

## 2023-12-27 ENCOUNTER — APPOINTMENT (OUTPATIENT)
Dept: FAMILY MEDICINE | Facility: CLINIC | Age: 68
End: 2023-12-27
Payer: MEDICARE

## 2023-12-27 VITALS
WEIGHT: 255 LBS | RESPIRATION RATE: 14 BRPM | DIASTOLIC BLOOD PRESSURE: 80 MMHG | OXYGEN SATURATION: 98 % | HEART RATE: 75 BPM | HEIGHT: 75 IN | SYSTOLIC BLOOD PRESSURE: 146 MMHG | BODY MASS INDEX: 31.71 KG/M2

## 2023-12-27 VITALS — DIASTOLIC BLOOD PRESSURE: 80 MMHG | SYSTOLIC BLOOD PRESSURE: 134 MMHG

## 2023-12-27 DIAGNOSIS — L25.8 UNSPECIFIED CONTACT DERMATITIS DUE TO OTHER AGENTS: ICD-10-CM

## 2023-12-27 PROCEDURE — 99213 OFFICE O/P EST LOW 20 MIN: CPT

## 2023-12-27 RX ORDER — BETAMETHASONE DIPROPIONATE 0.5 MG/G
0.05 CREAM TOPICAL TWICE DAILY
Qty: 1 | Refills: 0 | Status: ACTIVE | COMMUNITY
Start: 2023-12-27 | End: 1900-01-01

## 2023-12-27 NOTE — PLAN
[FreeTextEntry1] :  contact dermatitis start betamethasone dipropionate 0.05% cream bid x 2 weeks maximum do not use on genitals but denies rash there  f/u if no relief pt agreed w/plan

## 2023-12-27 NOTE — PHYSICAL EXAM
[No Lymphadenopathy] : no lymphadenopathy [Supple] : supple [Normal] : normal rate, regular rhythm, normal S1 and S2 and no murmur heard [No Edema] : there was no peripheral edema [No Focal Deficits] : no focal deficits [Normal Affect] : the affect was normal [Normal Mood] : the mood was normal [Normal Insight/Judgement] : insight and judgment were intact [de-identified] : no calf tenderness b/l LE [de-identified] : +erythematous rash on posterior arms with excoriations noted, erythema in anterior upper thighs b/l

## 2023-12-27 NOTE — HISTORY OF PRESENT ILLNESS
[FreeTextEntry8] : pt c/o rash on arms and inner thighs +red +itchy +warm x 2 weeks  denies new lotions,soaps or detergents or clothing he has been using otc cortisone cream denies fevers, chills

## 2024-01-05 ENCOUNTER — APPOINTMENT (OUTPATIENT)
Dept: CT IMAGING | Facility: CLINIC | Age: 69
End: 2024-01-05
Payer: MEDICARE

## 2024-01-05 ENCOUNTER — RESULT REVIEW (OUTPATIENT)
Age: 69
End: 2024-01-05

## 2024-01-05 PROCEDURE — 75574 CT ANGIO HRT W/3D IMAGE: CPT

## 2024-07-10 ENCOUNTER — APPOINTMENT (OUTPATIENT)
Dept: FAMILY MEDICINE | Facility: CLINIC | Age: 69
End: 2024-07-10
Payer: MEDICARE

## 2024-07-10 VITALS
TEMPERATURE: 98.1 F | RESPIRATION RATE: 14 BRPM | WEIGHT: 255 LBS | BODY MASS INDEX: 31.71 KG/M2 | DIASTOLIC BLOOD PRESSURE: 80 MMHG | HEIGHT: 75 IN | SYSTOLIC BLOOD PRESSURE: 134 MMHG | HEART RATE: 65 BPM | OXYGEN SATURATION: 98 %

## 2024-07-10 DIAGNOSIS — Z00.00 ENCOUNTER FOR GENERAL ADULT MEDICAL EXAMINATION W/OUT ABNORMAL FINDINGS: ICD-10-CM

## 2024-07-10 PROCEDURE — G0439: CPT

## 2024-07-10 PROCEDURE — G0444 DEPRESSION SCREEN ANNUAL: CPT

## 2024-07-13 PROBLEM — Z00.00 MEDICARE ANNUAL WELLNESS VISIT, SUBSEQUENT: Status: ACTIVE | Noted: 2024-07-13

## 2024-07-22 DIAGNOSIS — D69.6 THROMBOCYTOPENIA, UNSPECIFIED: ICD-10-CM

## 2024-08-23 ENCOUNTER — APPOINTMENT (OUTPATIENT)
Dept: NEUROLOGY | Facility: CLINIC | Age: 69
End: 2024-08-23
Payer: MEDICARE

## 2024-08-23 VITALS
WEIGHT: 252 LBS | HEIGHT: 75 IN | BODY MASS INDEX: 31.33 KG/M2 | HEART RATE: 68 BPM | DIASTOLIC BLOOD PRESSURE: 84 MMHG | SYSTOLIC BLOOD PRESSURE: 163 MMHG

## 2024-08-23 DIAGNOSIS — R41.89 OTHER SYMPTOMS AND SIGNS INVOLVING COGNITIVE FUNCTIONS AND AWARENESS: ICD-10-CM

## 2024-08-23 DIAGNOSIS — G91.9 HYDROCEPHALUS, UNSPECIFIED: ICD-10-CM

## 2024-08-23 DIAGNOSIS — T85.09XA OTHER MECHANICAL COMPLICATION OF VENTRICULAR INTRACRANIAL (COMMUNICATING) SHUNT, INITIAL ENCOUNTER: ICD-10-CM

## 2024-08-23 DIAGNOSIS — H93.19 TINNITUS, UNSPECIFIED EAR: ICD-10-CM

## 2024-08-23 DIAGNOSIS — M54.16 RADICULOPATHY, LUMBAR REGION: ICD-10-CM

## 2024-08-23 PROCEDURE — G2211 COMPLEX E/M VISIT ADD ON: CPT

## 2024-08-23 PROCEDURE — 99205 OFFICE O/P NEW HI 60 MIN: CPT

## 2024-08-23 NOTE — HISTORY OF PRESENT ILLNESS
[FreeTextEntry1] : NO COVID.   COVID VACCINE FULL.  HPI: 69-year-old male presents today for initial cognitive evaluation. He had brain surgery in  for hydrocephalus and aquaeductal stenosis- has a shunt. He has had 10 revisions since and is under care of neurosurgeon. He has seen Dr. Huizar for cognitive issues had npt and seen by neurologist in last 8-10 years. His memory has declined more recently. He lives with his wife. His stm loss is worse. STM retention has declined. It's been a progression over years. No hallucinations or delusions. He misplaces items and forgets what he did with it. His mother had Dementia (low 80s). He's been retired since . He retired due to forgetfulness and cognitive issues. He's a little depressed due to having to stop working due to cognitive issues. He was angry for years. No recent falls. No recent hospitalizations    PMH: HTN, HLD   -Memory: STM loss   -Speech: worsening comprehension- needs things expressed different ways   -Orientation: ok -Praxis: ok  -Decision making/Executive fx/Multitasking: good    -Sleep: 5 hours  -Appetite: good   -Motor symptoms: none     -B/B: sometimes  -Psychiatric symptoms: anxiety     -Functional status:   Duarte Index of [place] in Activities of Daily Livin. Bathing/Showerin  2. Dressin  3. Toiletin   4. Transferrin 5. Continence:  0 6: Feedin TOTAL:  6     [place] Instrumental Activities of Daily Living:  A. Ability to Use Telephone: 1   B. Shoppin C. Food Preparation: 1    D. Housekeepin   E. Laundry:  1 F. Transportation: 1    G. 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 Aby Wei  Workup done: None

## 2024-08-23 NOTE — PHYSICAL EXAM
[General Appearance - Alert] : alert [Date / Time ___ / 5] : date / time [unfilled] / 5 [Place ___ / 5] : place [unfilled] / 5 [Registration ___ / 3] : registration [unfilled] / 3 [Serial Sevens ___/5] : serial sevens [unfilled] / 5 [Naming 2 Objects ___ / 2] : naming two objects [unfilled] / 2 [Repeating a Sentence ___ / 1] : repeating a sentence [unfilled] / 1 [3-stage Verbal Command ___ / 3] : three-stage verbal command [unfilled] / 3 [Written Command ___ / 1] : written command [unfilled] / 1 [Copy a Design ___ / 1] : copy a design [unfilled] / 1 [Person] : oriented to person [Current Events] : adequate knowledge of current events [Past History] : adequate knowledge of personal past history [Cranial Nerves Optic (II)] : visual acuity intact bilaterally,  visual fields full to confrontation, pupils equal round and reactive to light [Cranial Nerves Oculomotor (III)] : extraocular motion intact [Cranial Nerves Trigeminal (V)] : facial sensation intact symmetrically [Cranial Nerves Facial (VII)] : face symmetrical [Cranial Nerves Vestibulocochlear (VIII)] : hearing was intact bilaterally [Cranial Nerves Glossopharyngeal (IX)] : tongue and palate midline [Cranial Nerves Accessory (XI - Cranial And Spinal)] : head turning and shoulder shrug symmetric [Cranial Nerves Hypoglossal (XII)] : there was no tongue deviation with protrusion [Motor Strength Lower Extremities Bilaterally] : there was weakness in both lower extremities [Abnormal Walk] : normal gait [2+] : Ankle jerk left 2+ [Sclera] : the sclera and conjunctiva were normal [PERRL With Normal Accommodation] : pupils were equal in size, round, reactive to light, with normal accommodation [Extraocular Movements] : extraocular movements were intact [Full Visual Field] : full visual field [Affect] : the affect was normal [Place] : oriented to place [Time] : oriented to time [Remote Intact] : remote memory intact [Registration Intact] : recent registration memory intact [Span Intact] : the attention span was normal [Concentration Intact] : normal concentrating ability [Visual Intact] : visual attention was ~T not ~L decreased [Naming Objects] : no difficulty naming common objects [Repeating Phrases] : no difficulty repeating a phrase [Writing A Sentence] : no difficulty writing a sentence [Fluency] : fluency intact [Comprehension] : comprehension intact [Reading] : reading intact [Vocabulary] : adequate range of vocabulary [Total Score ___ / 30] : the patient achieved a score of [unfilled] /30 [Writing a Sentence ___ / 1] : write sentence [unfilled] / 1 [Recall ___ / 3] : recall [unfilled] / 3 [Short Term Intact] : short term memory impaired [Romberg's Sign] : Romberg's sign was negtive [Limited Balance] : balance was intact [Tremor] : no tremor present [FreeTextEntry4] : Mental Status Exam   Presidents: 5/5 Alternating Pattern:ok  Spiral: ok Clock: 2/3 Repetition: ok Trail A: B:  Fluency: A: 8 Animals: 12   Go-No-Go: ok   R/L discrimination on self and examiner: ok  Cross-line commands: ok  Praxis:   Motor:om  dynamic/Luria: ok -Ideomotor/Imitation: ok   -Ideational/writing/closing-in: ok  -Dressing: ok [FreeTextEntry6] : right side weaker than left- he exercise 2-3x weekly

## 2024-08-23 NOTE — HISTORY OF PRESENT ILLNESS
[FreeTextEntry1] : NO COVID.   COVID VACCINE FULL.  HPI: 69-year-old male presents today for initial cognitive evaluation. He had brain surgery in  for hydrocephalus and aquaeductal stenosis- has a shunt. He has had 10 revisions since and is under care of neurosurgeon. He has seen Dr. Huizar for cognitive issues had npt and seen by neurologist in last 8-10 years. His memory has declined more recently. He lives with his wife. His stm loss is worse. STM retention has declined. It's been a progression over years. No hallucinations or delusions. He misplaces items and forgets what he did with it. His mother had Dementia (low 80s). He's been retired since . He retired due to forgetfulness and cognitive issues. He's a little depressed due to having to stop working due to cognitive issues. He was angry for years. No recent falls. No recent hospitalizations    PMH: HTN, HLD   -Memory: STM loss   -Speech: worsening comprehension- needs things expressed different ways   -Orientation: ok -Praxis: ok  -Decision making/Executive fx/Multitasking: good    -Sleep: 5 hours  -Appetite: good   -Motor symptoms: none     -B/B: sometimes  -Psychiatric symptoms: anxiety     -Functional status:   Duarte Index of [place] in Activities of Daily Livin. Bathing/Showerin  2. Dressin  3. Toiletin   4. Transferrin 5. Continence:  0 6: Feedin TOTAL:  6     [place] Instrumental Activities of Daily Living:  A. Ability to Use Telephone: 1   B. Shoppin C. Food Preparation: 1    D. Housekeepin   E. Laundry:  1 F. Transportation: 1    G. 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 Aby Wei  Workup done: None

## 2024-08-23 NOTE — REVIEW OF SYSTEMS
[As Noted in HPI] : as noted in HPI [Confused or Disoriented] : confusion [Memory Lapses or Loss] : memory loss [Repeating Questions] : repeated questioning about recent events [Leg Weakness] : leg weakness [Lightheadedness] : lightheadedness [Tension Headache] : tension-type headaches [Shortness Of Breath] : shortness of breath [Incontinence] : incontinence [Negative] : Heme/Lymph [Decr. Concentrating Ability] : no decrease in concentrating ability [Difficulty with Language] : no ~M difficulty with language [Changed Thought Patterns] : no change in thought patterns [Facial Weakness] : no facial weakness [Arm Weakness] : no arm weakness [Hand Weakness] : no hand weakness [Poor Coordination] : good coordination [Difficulty Writing] : no difficulty writing [Difficulties in Speech] : no speech difficulties [Numbness] : no numbness [Tingling] : no tingling [Abnormal Sensation] : no abnormal sensation [Hypersensitivity] : no hypersensitivity [Seizures] : no convulsions [Dizziness] : no dizziness [Fainting] : no fainting [Vertigo] : no vertigo [Cluster Headache] : no cluster headache [Migraine Headache] : no migraine headache [Difficulty Walking] : no difficulty walking [Inability to Walk] : able to walk [Ataxia] : no ataxia [Frequent Falls] : not falling [Limping] : not limping [FreeTextEntry4] : tinnitis

## 2024-08-23 NOTE — ASSESSMENT
[FreeTextEntry1] : Assessment: 69 year old male with pmh htn and hld. Independent in adls and iadls. MMSE 26/30 with poor recall of words. Wrote wrong time in clock but able to draw a clock correctly. Able to write a sentence. History of hydrocephalus and has shunt in place.   Diagnostic Impression:     -cognitive impairment -memory loss -Anxiety and depression  -history of hydrocephalus   Plan: -Rule out reversible and vascular causes - MRI head -B vitamins, TFT, RPR -Lyme serology -Basic labs -Educated on importance of lifestyle modifications such as daily exercise, healthy balanced diet and good sleep habits  -npt

## 2024-09-30 DIAGNOSIS — R94.31 ABNORMAL ELECTROCARDIOGRAM [ECG] [EKG]: ICD-10-CM

## 2024-10-16 ENCOUNTER — APPOINTMENT (OUTPATIENT)
Dept: CARDIOLOGY | Facility: CLINIC | Age: 69
End: 2024-10-16

## 2024-10-25 ENCOUNTER — APPOINTMENT (OUTPATIENT)
Dept: FAMILY MEDICINE | Facility: CLINIC | Age: 69
End: 2024-10-25
Payer: MEDICARE

## 2024-10-25 VITALS
OXYGEN SATURATION: 96 % | SYSTOLIC BLOOD PRESSURE: 140 MMHG | HEIGHT: 75 IN | BODY MASS INDEX: 31.71 KG/M2 | HEART RATE: 70 BPM | TEMPERATURE: 98.4 F | DIASTOLIC BLOOD PRESSURE: 78 MMHG | WEIGHT: 255 LBS | RESPIRATION RATE: 14 BRPM

## 2024-10-25 DIAGNOSIS — J06.9 ACUTE UPPER RESPIRATORY INFECTION, UNSPECIFIED: ICD-10-CM

## 2024-10-25 DIAGNOSIS — J01.90 ACUTE SINUSITIS, UNSPECIFIED: ICD-10-CM

## 2024-10-25 PROCEDURE — 99213 OFFICE O/P EST LOW 20 MIN: CPT

## 2024-10-29 LAB
INFLUENZA A RESULT: NOT DETECTED
INFLUENZA B RESULT: NOT DETECTED
RESP SYN VIRUS RESULT: NOT DETECTED
SARS-COV-2 RESULT: NOT DETECTED

## 2024-11-07 ENCOUNTER — APPOINTMENT (OUTPATIENT)
Dept: PHYSICAL MEDICINE AND REHAB | Facility: CLINIC | Age: 69
End: 2024-11-07

## 2024-11-07 PROCEDURE — ZZZZZ: CPT

## 2024-11-22 ENCOUNTER — APPOINTMENT (OUTPATIENT)
Dept: PHYSICAL MEDICINE AND REHAB | Facility: CLINIC | Age: 69
End: 2024-11-22

## 2024-11-22 PROCEDURE — 96132 NRPSYC TST EVAL PHYS/QHP 1ST: CPT

## 2024-11-22 PROCEDURE — 96116 NUBHVL XM PHYS/QHP 1ST HR: CPT

## 2024-11-22 PROCEDURE — 96133 NRPSYC TST EVAL PHYS/QHP EA: CPT

## 2024-11-22 PROCEDURE — 96137 PSYCL/NRPSYC TST PHY/QHP EA: CPT

## 2024-11-22 PROCEDURE — 96136 PSYCL/NRPSYC TST PHY/QHP 1ST: CPT

## 2024-12-20 ENCOUNTER — TRANSCRIPTION ENCOUNTER (OUTPATIENT)
Age: 69
End: 2024-12-20

## 2024-12-31 ENCOUNTER — APPOINTMENT (OUTPATIENT)
Dept: FAMILY MEDICINE | Facility: CLINIC | Age: 69
End: 2024-12-31
Payer: MEDICARE

## 2024-12-31 DIAGNOSIS — R05.9 COUGH, UNSPECIFIED: ICD-10-CM

## 2024-12-31 DIAGNOSIS — U07.1 COVID-19: ICD-10-CM

## 2024-12-31 PROCEDURE — 99441: CPT | Mod: 93

## 2025-01-01 LAB
INFLUENZA A RESULT: NOT DETECTED
INFLUENZA B RESULT: NOT DETECTED
RESP SYN VIRUS RESULT: NOT DETECTED
SARS-COV-2 RESULT: DETECTED

## 2025-01-04 RX ORDER — BENZONATATE 200 MG/1
200 CAPSULE ORAL 3 TIMES DAILY
Qty: 30 | Refills: 0 | Status: ACTIVE | COMMUNITY
Start: 2025-01-04 | End: 1900-01-01

## 2025-01-09 RX ORDER — NIRMATRELVIR AND RITONAVIR 300-100 MG
20 X 150 MG & KIT ORAL
Qty: 1 | Refills: 0 | Status: DISCONTINUED | COMMUNITY
Start: 2024-12-31 | End: 2025-01-09

## 2025-02-24 ENCOUNTER — APPOINTMENT (OUTPATIENT)
Dept: NEUROLOGY | Facility: CLINIC | Age: 70
End: 2025-02-24
Payer: MEDICARE

## 2025-02-24 VITALS
WEIGHT: 250 LBS | BODY MASS INDEX: 31.08 KG/M2 | DIASTOLIC BLOOD PRESSURE: 90 MMHG | HEART RATE: 68 BPM | SYSTOLIC BLOOD PRESSURE: 169 MMHG | HEIGHT: 75 IN

## 2025-02-24 PROCEDURE — 99205 OFFICE O/P NEW HI 60 MIN: CPT

## 2025-02-24 PROCEDURE — G2211 COMPLEX E/M VISIT ADD ON: CPT

## 2025-07-16 ENCOUNTER — NON-APPOINTMENT (OUTPATIENT)
Age: 70
End: 2025-07-16

## 2025-07-16 ENCOUNTER — APPOINTMENT (OUTPATIENT)
Dept: CARDIOLOGY | Facility: CLINIC | Age: 70
End: 2025-07-16
Payer: MEDICARE

## 2025-07-16 PROBLEM — I10 HYPERTENSION: Status: ACTIVE | Noted: 2025-07-16

## 2025-07-16 PROCEDURE — 93880 EXTRACRANIAL BILAT STUDY: CPT

## 2025-07-16 PROCEDURE — 93306 TTE W/DOPPLER COMPLETE: CPT

## 2025-07-17 ENCOUNTER — NON-APPOINTMENT (OUTPATIENT)
Age: 70
End: 2025-07-17

## 2025-07-25 DIAGNOSIS — R79.89 OTHER SPECIFIED ABNORMAL FINDINGS OF BLOOD CHEMISTRY: ICD-10-CM

## 2025-09-04 ENCOUNTER — APPOINTMENT (OUTPATIENT)
Dept: CARDIOLOGY | Facility: CLINIC | Age: 70
End: 2025-09-04
Payer: MEDICARE

## 2025-09-04 PROCEDURE — 93016 CV STRESS TEST SUPVJ ONLY: CPT

## 2025-09-04 PROCEDURE — A9500: CPT

## 2025-09-04 PROCEDURE — 93017 CV STRESS TEST TRACING ONLY: CPT

## 2025-09-04 PROCEDURE — 93018 CV STRESS TEST I&R ONLY: CPT

## 2025-09-04 PROCEDURE — 78452 HT MUSCLE IMAGE SPECT MULT: CPT

## 2025-09-15 ENCOUNTER — TRANSCRIPTION ENCOUNTER (OUTPATIENT)
Age: 70
End: 2025-09-15

## 2025-09-25 PROBLEM — E66.9 OBESITY: Status: ACTIVE | Noted: 2025-09-25
